# Patient Record
Sex: FEMALE | Race: WHITE | NOT HISPANIC OR LATINO | Employment: OTHER | URBAN - METROPOLITAN AREA
[De-identification: names, ages, dates, MRNs, and addresses within clinical notes are randomized per-mention and may not be internally consistent; named-entity substitution may affect disease eponyms.]

---

## 2018-02-13 ENCOUNTER — HOSPITAL ENCOUNTER (EMERGENCY)
Facility: HOSPITAL | Age: 33
Discharge: HOME | End: 2018-02-13
Attending: EMERGENCY MEDICINE | Admitting: EMERGENCY MEDICINE

## 2018-02-13 VITALS
OXYGEN SATURATION: 100 % | HEART RATE: 87 BPM | SYSTOLIC BLOOD PRESSURE: 120 MMHG | BODY MASS INDEX: 40.09 KG/M2 | WEIGHT: 280 LBS | RESPIRATION RATE: 16 BRPM | DIASTOLIC BLOOD PRESSURE: 87 MMHG | HEIGHT: 70 IN | TEMPERATURE: 97.9 F

## 2018-02-13 DIAGNOSIS — R03.0 ELEVATED BLOOD PRESSURE READING: Primary | ICD-10-CM

## 2018-02-13 LAB
ABO GROUP BLD: NORMAL
HCG INTACT+B SERPL-ACNC: <0.5 MIU/ML
RH BLD: POSITIVE

## 2018-02-13 PROCEDURE — 99283 EMERGENCY DEPT VISIT LOW MDM: CPT

## 2018-02-13 PROCEDURE — 99282 EMERGENCY DEPT VISIT SF MDM: CPT | Performed by: EMERGENCY MEDICINE

## 2018-02-13 PROCEDURE — 86900 BLOOD TYPING SEROLOGIC ABO: CPT | Performed by: EMERGENCY MEDICINE

## 2018-02-13 PROCEDURE — 84702 CHORIONIC GONADOTROPIN TEST: CPT | Performed by: EMERGENCY MEDICINE

## 2018-02-13 PROCEDURE — 86901 BLOOD TYPING SEROLOGIC RH(D): CPT | Performed by: EMERGENCY MEDICINE

## 2018-02-13 RX ORDER — IPRATROPIUM BROMIDE 21 UG/1
2 SPRAY, METERED NASAL EVERY 4 HOURS PRN
COMMUNITY
End: 2020-11-23

## 2018-02-13 NOTE — ED PROVIDER NOTES
Subjective   Patient is a 32 y.o. female presenting with hypertension.   History provided by:  Patient  Hypertension   Severity:  Moderate  Onset quality:  Gradual  Duration:  1 week  Timing:  Constant  Progression:  Worsening  Chronicity:  Recurrent (with pregnancy)  Context comment:  As described below.  Ineffective treatments:  None tried  Associated symptoms: nausea and vomiting    Associated symptoms: no abdominal pain, no blurred vision, no chest pain, no confusion, no dizziness, no ear pain, no epistaxis, no fatigue, no fever, no headaches, no hematuria, no hypokalemia, no loss of consciousness, no neck pain, no palpitations, no peripheral edema, no shortness of breath, no syncope, no tinnitus and no weakness    Risk factors: obesity and tobacco use    Risk factors: no alcohol use, no cardiac disease, no prior aneurysm, no prior stroke and no PVD    Risk factors comment:  Gestational hypertension    HPI Narrative:Ms. Sarita Duncan is a 33 yo WF who presents secondary to elevated BP and possible pregnancy.  Last normal menstrual period was in December.  Patient had several home urine pregnancy test were positive.  Last week she began to experience vaginal bleeding.  She was seen at  A another emergency room.  She was told that she was likely pregnant and miscarrying.  Her urine pregnancy test was borderline.  There was no evidence of intrauterine pregnancy on ultrasound.  Patient reports her blood pressure has been elevated for over a week.  The only time she has hypertension is pregnancy associated.  Patient reports her vaginal bleeding is much improved.  Patient presents for evaluation.        Review of Systems   Constitutional: Negative.  Negative for appetite change, diaphoresis, fatigue and fever.   HENT: Negative.  Negative for ear pain, nosebleeds and tinnitus.    Eyes: Negative.  Negative for blurred vision.   Respiratory: Negative for cough, chest tightness, shortness of breath and wheezing.     Cardiovascular: Negative for chest pain, palpitations, leg swelling and syncope.   Gastrointestinal: Positive for nausea and vomiting. Negative for abdominal pain.   Genitourinary: Negative.  Negative for difficulty urinating, flank pain, frequency and hematuria.   Musculoskeletal: Negative.  Negative for neck pain.   Skin: Negative.  Negative for color change, pallor and rash.   Neurological: Negative.  Negative for dizziness, seizures, loss of consciousness, syncope, weakness and headaches.   Psychiatric/Behavioral: Negative.  Negative for agitation, behavioral problems, confusion and hallucinations.       Past Medical History:   Diagnosis Date   • Asthma    • COPD (chronic obstructive pulmonary disease)    • Histoplasmosis    • Hypothyroid    • Reactive airway disease        Allergies   Allergen Reactions   • Eggs Or Egg-Derived Products GI Intolerance   • Ibuprofen GI Intolerance       Past Surgical History:   Procedure Laterality Date   •  SECTION         History reviewed. No pertinent family history.    Social History     Social History   • Marital status: Significant Other     Spouse name: N/A   • Number of children: N/A   • Years of education: N/A     Social History Main Topics   • Smoking status: Current Every Day Smoker     Packs/day: 1.00     Types: Cigarettes   • Smokeless tobacco: Never Used   • Alcohol use No   • Drug use: No   • Sexual activity: Yes     Partners: Female     Other Topics Concern   • None     Social History Narrative   • None           Objective   Physical Exam   Constitutional: She is oriented to person, place, and time. She appears well-developed and well-nourished. No distress.   32-year-old white female sitting in bed.  Patient appears in good overall health.  She is accompanied by her spouse.   HENT:   Head: Normocephalic and atraumatic.   Right Ear: External ear normal.   Left Ear: External ear normal.   Nose: Nose normal.   Mouth/Throat: Oropharynx is clear and moist.    Eyes: Conjunctivae and EOM are normal. Pupils are equal, round, and reactive to light.   Neck: Normal range of motion. Neck supple.   Cardiovascular: Normal rate, regular rhythm, normal heart sounds and intact distal pulses.  Exam reveals no gallop and no friction rub.    No murmur heard.  Pulmonary/Chest: Effort normal and breath sounds normal.   Abdominal: Soft. Bowel sounds are normal. She exhibits no distension. There is no tenderness.   Musculoskeletal: Normal range of motion.   Neurological: She is alert and oriented to person, place, and time.   Skin: Skin is warm and dry. She is not diaphoretic.   Psychiatric: She has a normal mood and affect. Her behavior is normal.   Nursing note and vitals reviewed.      Procedures         ED Course  ED Course   Comment By Time   02/13/18  5:20 PM  Patient is unsure if she is actually pregnant.  Results were inconclusive at her ER visit and for effort per her report.  They're also concerned that patient could be miscarrying.  I'll obtain blood quantitative hCG. Srinath May MD 02/13 1972   02/13/18  6:41 PM  Patient's blood pressure now 119 or 85.  Quantitative beta hCG is undetectable.  I resected this is not consistent with pregnancy.  It is certainly possible that patient miscarried.  I suspect her blood pressure will normalize over the next week.  Recommend patient stop smoking as well as limit her caffeine to 2 caffeinated drinks per day.  Patient drinks many more caffeinated drinks been this low she is unwilling to give me an exact number.  Discussed at length with patient results, diagnoses treatment and follow-up.  Will DC home. Srinath May MD 02/13 5099      Labs Reviewed   HCG, QUANTITATIVE, PREGNANCY    Narrative:     HCG Expected Values:     Nonpregnant females:               less than 5 mIU/mL     Males:                             less than 5 mIU/mL    Pregnant females:   0-1 Weeks Gestation                5-50   1-2 Weeks Gestation                    2-3 Weeks Gestation                100-5000   3-4 Weeks Gestation                [id number]  4-5 Weeks Gestation                [id number]   5-6 Weeks Gestation                [id number]   6-8 Weeks Gestation                [id number]   2-3 Months Gestation               [id number]      Note:  If a value is between 5-25 mIU/mL recommend            repeat testing and clinical correlation.   ABO/RH             MDM  Number of Diagnoses or Management Options  Elevated blood pressure reading: new and requires workup     Amount and/or Complexity of Data Reviewed  Clinical lab tests: reviewed and ordered    Risk of Complications, Morbidity, and/or Mortality  Presenting problems: moderate  Diagnostic procedures: moderate  Management options: low    Patient Progress  Patient progress: improved      Final diagnoses:   Elevated blood pressure reading              Srinath May MD  02/15/18 0881

## 2018-02-13 NOTE — DISCHARGE INSTRUCTIONS
"Take blood pressure twice daily and record.  Take his record to follow-up with your PMD in 1 week.  Call tomorrow to arrange follow-up.  Follow-up sooner if needed.  Recommend healthy diet and regular exercise.  Recommend stop smoking.  Limit caffeine to 2 caffeinated drinks per day.  Return to ED for worsening symptoms, medical emergencies.    DASH Eating Plan  DASH stands for \"Dietary Approaches to Stop Hypertension.\" The DASH eating plan is a healthy eating plan that has been shown to reduce high blood pressure (hypertension). It may also reduce your risk for type 2 diabetes, heart disease, and stroke. The DASH eating plan may also help with weight loss.  What are tips for following this plan?  General guidelines   · Avoid eating more than 2,300 mg (milligrams) of salt (sodium) a day. If you have hypertension, you may need to reduce your sodium intake to 1,500 mg a day.  · Limit alcohol intake to no more than 1 drink a day for nonpregnant women and 2 drinks a day for men. One drink equals 12 oz of beer, 5 oz of wine, or 1½ oz of hard liquor.  · Work with your health care provider to maintain a healthy body weight or to lose weight. Ask what an ideal weight is for you.  · Get at least 30 minutes of exercise that causes your heart to beat faster (aerobic exercise) most days of the week. Activities may include walking, swimming, or biking.  · Work with your health care provider or diet and nutrition specialist (dietitian) to adjust your eating plan to your individual calorie needs.  Reading food labels   · Check food labels for the amount of sodium per serving. Choose foods with less than 5 percent of the Daily Value of sodium. Generally, foods with less than 300 mg of sodium per serving fit into this eating plan.  · To find whole grains, look for the word \"whole\" as the first word in the ingredient list.  Shopping   · Buy products labeled as \"low-sodium\" or \"no salt added.\"  · Buy fresh foods. Avoid canned foods " and premade or frozen meals.  Cooking   · Avoid adding salt when cooking. Use salt-free seasonings or herbs instead of table salt or sea salt. Check with your health care provider or pharmacist before using salt substitutes.  · Do not araujo foods. Cook foods using healthy methods such as baking, boiling, grilling, and broiling instead.  · Cook with heart-healthy oils, such as olive, canola, soybean, or sunflower oil.  Meal planning     · Eat a balanced diet that includes:  ¨ 5 or more servings of fruits and vegetables each day. At each meal, try to fill half of your plate with fruits and vegetables.  ¨ Up to 6-8 servings of whole grains each day.  ¨ Less than 6 oz of lean meat, poultry, or fish each day. A 3-oz serving of meat is about the same size as a deck of cards. One egg equals 1 oz.  ¨ 2 servings of low-fat dairy each day.  ¨ A serving of nuts, seeds, or beans 5 times each week.  ¨ Heart-healthy fats. Healthy fats called Omega-3 fatty acids are found in foods such as flaxseeds and coldwater fish, like sardines, salmon, and mackerel.  · Limit how much you eat of the following:  ¨ Canned or prepackaged foods.  ¨ Food that is high in trans fat, such as fried foods.  ¨ Food that is high in saturated fat, such as fatty meat.  ¨ Sweets, desserts, sugary drinks, and other foods with added sugar.  ¨ Full-fat dairy products.  · Do not salt foods before eating.  · Try to eat at least 2 vegetarian meals each week.  · Eat more home-cooked food and less restaurant, buffet, and fast food.  · When eating at a restaurant, ask that your food be prepared with less salt or no salt, if possible.  What foods are recommended?  The items listed may not be a complete list. Talk with your dietitian about what dietary choices are best for you.  Grains   Whole-grain or whole-wheat bread. Whole-grain or whole-wheat pasta. Brown rice. Oatmeal. Quinoa. Bulgur. Whole-grain and low-sodium cereals. Maricruz bread. Low-fat, low-sodium crackers.  Whole-wheat flour tortillas.  Vegetables   Fresh or frozen vegetables (raw, steamed, roasted, or grilled). Low-sodium or reduced-sodium tomato and vegetable juice. Low-sodium or reduced-sodium tomato sauce and tomato paste. Low-sodium or reduced-sodium canned vegetables.  Fruits   All fresh, dried, or frozen fruit. Canned fruit in natural juice (without added sugar).  Meat and other protein foods   Skinless chicken or turkey. Ground chicken or turkey. Pork with fat trimmed off. Fish and seafood. Egg whites. Dried beans, peas, or lentils. Unsalted nuts, nut butters, and seeds. Unsalted canned beans. Lean cuts of beef with fat trimmed off. Low-sodium, lean deli meat.  Dairy   Low-fat (1%) or fat-free (skim) milk. Fat-free, low-fat, or reduced-fat cheeses. Nonfat, low-sodium ricotta or cottage cheese. Low-fat or nonfat yogurt. Low-fat, low-sodium cheese.  Fats and oils   Soft margarine without trans fats. Vegetable oil. Low-fat, reduced-fat, or light mayonnaise and salad dressings (reduced-sodium). Canola, safflower, olive, soybean, and sunflower oils. Avocado.  Seasoning and other foods   Herbs. Spices. Seasoning mixes without salt. Unsalted popcorn and pretzels. Fat-free sweets.  What foods are not recommended?  The items listed may not be a complete list. Talk with your dietitian about what dietary choices are best for you.  Grains   Baked goods made with fat, such as croissants, muffins, or some breads. Dry pasta or rice meal packs.  Vegetables   Creamed or fried vegetables. Vegetables in a cheese sauce. Regular canned vegetables (not low-sodium or reduced-sodium). Regular canned tomato sauce and paste (not low-sodium or reduced-sodium). Regular tomato and vegetable juice (not low-sodium or reduced-sodium). Pickles. Olives.  Fruits   Canned fruit in a light or heavy syrup. Fried fruit. Fruit in cream or butter sauce.  Meat and other protein foods   Fatty cuts of meat. Ribs. Fried meat. Nichols. Sausage. Bologna and  other processed lunch meats. Salami. Fatback. Hotdogs. Bratwurst. Salted nuts and seeds. Canned beans with added salt. Canned or smoked fish. Whole eggs or egg yolks. Chicken or turkey with skin.  Dairy   Whole or 2% milk, cream, and half-and-half. Whole or full-fat cream cheese. Whole-fat or sweetened yogurt. Full-fat cheese. Nondairy creamers. Whipped toppings. Processed cheese and cheese spreads.  Fats and oils   Butter. Stick margarine. Lard. Shortening. Ghee. Nichols fat. Tropical oils, such as coconut, palm kernel, or palm oil.  Seasoning and other foods   Salted popcorn and pretzels. Onion salt, garlic salt, seasoned salt, table salt, and sea salt. Worcestershire sauce. Tartar sauce. Barbecue sauce. Teriyaki sauce. Soy sauce, including reduced-sodium. Steak sauce. Canned and packaged gravies. Fish sauce. Oyster sauce. Cocktail sauce. Horseradish that you find on the shelf. Ketchup. Mustard. Meat flavorings and tenderizers. Bouillon cubes. Hot sauce and Tabasco sauce. Premade or packaged marinades. Premade or packaged taco seasonings. Relishes. Regular salad dressings.  Where to find more information:  · National Heart, Lung, and Blood [place]: www.nhlbi.nih.gov  · American Heart Association: www.heart.org  Summary  · The DASH eating plan is a healthy eating plan that has been shown to reduce high blood pressure (hypertension). It may also reduce your risk for type 2 diabetes, heart disease, and stroke.  · With the DASH eating plan, you should limit salt (sodium) intake to 2,300 mg a day. If you have hypertension, you may need to reduce your sodium intake to 1,500 mg a day.  · When on the DASH eating plan, aim to eat more fresh fruits and vegetables, whole grains, lean proteins, low-fat dairy, and heart-healthy fats.  · Work with your health care provider or diet and nutrition specialist (dietitian) to adjust your eating plan to your individual calorie needs.  This information is not intended to replace advice  "given to you by your health care provider. Make sure you discuss any questions you have with your health care provider.  Document Released: 12/06/2012 Document Revised: 12/11/2017 Document Reviewed: 12/11/2017  [email or web address] Interactive Patient Education © 2017 [email or web address]      DASH Eating Plan  DASH stands for \"Dietary Approaches to Stop Hypertension.\" The DASH eating plan is a healthy eating plan that has been shown to reduce high blood pressure (hypertension). It may also reduce your risk for type 2 diabetes, heart disease, and stroke. The DASH eating plan may also help with weight loss.  What are tips for following this plan?  General guidelines   · Avoid eating more than 2,300 mg (milligrams) of salt (sodium) a day. If you have hypertension, you may need to reduce your sodium intake to 1,500 mg a day.  · Limit alcohol intake to no more than 1 drink a day for nonpregnant women and 2 drinks a day for men. One drink equals 12 oz of beer, 5 oz of wine, or 1½ oz of hard liquor.  · Work with your health care provider to maintain a healthy body weight or to lose weight. Ask what an ideal weight is for you.  · Get at least 30 minutes of exercise that causes your heart to beat faster (aerobic exercise) most days of the week. Activities may include walking, swimming, or biking.  · Work with your health care provider or diet and nutrition specialist (dietitian) to adjust your eating plan to your individual calorie needs.  Reading food labels   · Check food labels for the amount of sodium per serving. Choose foods with less than 5 percent of the Daily Value of sodium. Generally, foods with less than 300 mg of sodium per serving fit into this eating plan.  · To find whole grains, look for the word \"whole\" as the first word in the ingredient list.  Shopping   · Buy products labeled as \"low-sodium\" or \"no salt added.\"  · Buy fresh foods. Avoid canned foods and premade or frozen meals.  Cooking   · Avoid adding salt when cooking. " Use salt-free seasonings or herbs instead of table salt or sea salt. Check with your health care provider or pharmacist before using salt substitutes.  · Do not araujo foods. Cook foods using healthy methods such as baking, boiling, grilling, and broiling instead.  · Cook with heart-healthy oils, such as olive, canola, soybean, or sunflower oil.  Meal planning     · Eat a balanced diet that includes:  ¨ 5 or more servings of fruits and vegetables each day. At each meal, try to fill half of your plate with fruits and vegetables.  ¨ Up to 6-8 servings of whole grains each day.  ¨ Less than 6 oz of lean meat, poultry, or fish each day. A 3-oz serving of meat is about the same size as a deck of cards. One egg equals 1 oz.  ¨ 2 servings of low-fat dairy each day.  ¨ A serving of nuts, seeds, or beans 5 times each week.  ¨ Heart-healthy fats. Healthy fats called Omega-3 fatty acids are found in foods such as flaxseeds and coldwater fish, like sardines, salmon, and mackerel.  · Limit how much you eat of the following:  ¨ Canned or prepackaged foods.  ¨ Food that is high in trans fat, such as fried foods.  ¨ Food that is high in saturated fat, such as fatty meat.  ¨ Sweets, desserts, sugary drinks, and other foods with added sugar.  ¨ Full-fat dairy products.  · Do not salt foods before eating.  · Try to eat at least 2 vegetarian meals each week.  · Eat more home-cooked food and less restaurant, buffet, and fast food.  · When eating at a restaurant, ask that your food be prepared with less salt or no salt, if possible.  What foods are recommended?  The items listed may not be a complete list. Talk with your dietitian about what dietary choices are best for you.  Grains   Whole-grain or whole-wheat bread. Whole-grain or whole-wheat pasta. Brown rice. Oatmeal. Quinoa. Bulgur. Whole-grain and low-sodium cereals. Maricruz bread. Low-fat, low-sodium crackers. Whole-wheat flour tortillas.  Vegetables   Fresh or frozen vegetables (raw,  steamed, roasted, or grilled). Low-sodium or reduced-sodium tomato and vegetable juice. Low-sodium or reduced-sodium tomato sauce and tomato paste. Low-sodium or reduced-sodium canned vegetables.  Fruits   All fresh, dried, or frozen fruit. Canned fruit in natural juice (without added sugar).  Meat and other protein foods   Skinless chicken or turkey. Ground chicken or turkey. Pork with fat trimmed off. Fish and seafood. Egg whites. Dried beans, peas, or lentils. Unsalted nuts, nut butters, and seeds. Unsalted canned beans. Lean cuts of beef with fat trimmed off. Low-sodium, lean deli meat.  Dairy   Low-fat (1%) or fat-free (skim) milk. Fat-free, low-fat, or reduced-fat cheeses. Nonfat, low-sodium ricotta or cottage cheese. Low-fat or nonfat yogurt. Low-fat, low-sodium cheese.  Fats and oils   Soft margarine without trans fats. Vegetable oil. Low-fat, reduced-fat, or light mayonnaise and salad dressings (reduced-sodium). Canola, safflower, olive, soybean, and sunflower oils. Avocado.  Seasoning and other foods   Herbs. Spices. Seasoning mixes without salt. Unsalted popcorn and pretzels. Fat-free sweets.  What foods are not recommended?  The items listed may not be a complete list. Talk with your dietitian about what dietary choices are best for you.  Grains   Baked goods made with fat, such as croissants, muffins, or some breads. Dry pasta or rice meal packs.  Vegetables   Creamed or fried vegetables. Vegetables in a cheese sauce. Regular canned vegetables (not low-sodium or reduced-sodium). Regular canned tomato sauce and paste (not low-sodium or reduced-sodium). Regular tomato and vegetable juice (not low-sodium or reduced-sodium). Pickles. Olives.  Fruits   Canned fruit in a light or heavy syrup. Fried fruit. Fruit in cream or butter sauce.  Meat and other protein foods   Fatty cuts of meat. Ribs. Fried meat. Nichols. Sausage. Bologna and other processed lunch meats. Salami. Fatback. Hotdogs. Bratwurst. Salted nuts  and seeds. Canned beans with added salt. Canned or smoked fish. Whole eggs or egg yolks. Chicken or turkey with skin.  Dairy   Whole or 2% milk, cream, and half-and-half. Whole or full-fat cream cheese. Whole-fat or sweetened yogurt. Full-fat cheese. Nondairy creamers. Whipped toppings. Processed cheese and cheese spreads.  Fats and oils   Butter. Stick margarine. Lard. Shortening. Ghee. Nichols fat. Tropical oils, such as coconut, palm kernel, or palm oil.  Seasoning and other foods   Salted popcorn and pretzels. Onion salt, garlic salt, seasoned salt, table salt, and sea salt. Worcestershire sauce. Tartar sauce. Barbecue sauce. Teriyaki sauce. Soy sauce, including reduced-sodium. Steak sauce. Canned and packaged gravies. Fish sauce. Oyster sauce. Cocktail sauce. Horseradish that you find on the shelf. Ketchup. Mustard. Meat flavorings and tenderizers. Bouillon cubes. Hot sauce and Tabasco sauce. Premade or packaged marinades. Premade or packaged taco seasonings. Relishes. Regular salad dressings.  Where to find more information:  · National Heart, Lung, and Blood [place]: www.nhlbi.nih.gov  · American Heart Association: www.heart.org  Summary  · The DASH eating plan is a healthy eating plan that has been shown to reduce high blood pressure (hypertension). It may also reduce your risk for type 2 diabetes, heart disease, and stroke.  · With the DASH eating plan, you should limit salt (sodium) intake to 2,300 mg a day. If you have hypertension, you may need to reduce your sodium intake to 1,500 mg a day.  · When on the DASH eating plan, aim to eat more fresh fruits and vegetables, whole grains, lean proteins, low-fat dairy, and heart-healthy fats.  · Work with your health care provider or diet and nutrition specialist (dietitian) to adjust your eating plan to your individual calorie needs.  This information is not intended to replace advice given to you by your health care provider. Make sure you discuss any  questions you have with your health care provider.  Document Released: 12/06/2012 Document Revised: 12/11/2017 Document Reviewed: 12/11/2017  [employer] Interactive Patient Education © 2017 [employer]      Hypertension  Hypertension is another name for high blood pressure. High blood pressure forces your heart to work harder to pump blood. This can cause problems over time.  There are two numbers in a blood pressure reading. There is a top number (systolic) over a bottom number (diastolic). It is best to have a blood pressure below 120/80. Healthy choices can help lower your blood pressure. You may need medicine to help lower your blood pressure if:  · Your blood pressure cannot be lowered with healthy choices.  · Your blood pressure is higher than 130/80.  Follow these instructions at home:  Eating and drinking   · If directed, follow the DASH eating plan. This diet includes:  ¨ Filling half of your plate at each meal with fruits and vegetables.  ¨ Filling one quarter of your plate at each meal with whole grains. Whole grains include whole wheat pasta, brown rice, and whole grain bread.  ¨ Eating or drinking low-fat dairy products, such as skim milk or low-fat yogurt.  ¨ Filling one quarter of your plate at each meal with low-fat (lean) proteins. Low-fat proteins include fish, skinless chicken, eggs, beans, and tofu.  ¨ Avoiding fatty meat, cured and processed meat, or chicken with skin.  ¨ Avoiding premade or processed food.  · Eat less than 1,500 mg of salt (sodium) a day.  · Limit alcohol use to no more than 1 drink a day for nonpregnant women and 2 drinks a day for men. One drink equals 12 oz of beer, 5 oz of wine, or 1½ oz of hard liquor.  Lifestyle   · Work with your doctor to stay at a healthy weight or to lose weight. Ask your doctor what the best weight is for you.  · Get at least 30 minutes of exercise that causes your heart to beat faster (aerobic exercise) most days of the week. This may include  walking, swimming, or biking.  · Get at least 30 minutes of exercise that strengthens your muscles (resistance exercise) at least 3 days a week. This may include lifting weights or pilates.  · Do not use any products that contain nicotine or tobacco. This includes cigarettes and e-cigarettes. If you need help quitting, ask your doctor.  · Check your blood pressure at home as told by your doctor.  · Keep all follow-up visits as told by your doctor. This is important.  Medicines   · Take over-the-counter and prescription medicines only as told by your doctor. Follow directions carefully.  · Do not skip doses of blood pressure medicine. The medicine does not work as well if you skip doses. Skipping doses also puts you at risk for problems.  · Ask your doctor about side effects or reactions to medicines that you should watch for.  Contact a doctor if:  · You think you are having a reaction to the medicine you are taking.  · You have headaches that keep coming back (recurring).  · You feel dizzy.  · You have swelling in your ankles.  · You have trouble with your vision.  Get help right away if:  · You get a very bad headache.  · You start to feel confused.  · You feel weak or numb.  · You feel faint.  · You get very bad pain in your:  ¨ Chest.  ¨ Belly (abdomen).  · You throw up (vomit) more than once.  · You have trouble breathing.  Summary  · Hypertension is another name for high blood pressure.  · Making healthy choices can help lower blood pressure. If your blood pressure cannot be controlled with healthy choices, you may need to take medicine.  This information is not intended to replace advice given to you by your health care provider. Make sure you discuss any questions you have with your health care provider.  Document Released: 06/05/2009 Document Revised: 11/15/2017 Document Reviewed: 11/15/2017  [employer] Interactive Patient Education © 2017 [employer]      Hypertension  Hypertension, commonly called high  "blood pressure, is when the force of blood pumping through the arteries is too strong. The arteries are the blood vessels that carry blood from the heart throughout the body. Hypertension forces the heart to work harder to pump blood and may cause arteries to become narrow or stiff. Having untreated or uncontrolled hypertension can cause heart attacks, strokes, kidney disease, and other problems.  A blood pressure reading consists of a higher number over a lower number. Ideally, your blood pressure should be below 120/80. The first (\"top\") number is called the systolic pressure. It is a measure of the pressure in your arteries as your heart beats. The second (\"bottom\") number is called the diastolic pressure. It is a measure of the pressure in your arteries as the heart relaxes.  What are the causes?  The cause of this condition is not known.  What increases the risk?  Some risk factors for high blood pressure are under your control. Others are not.  Factors you can change   · Smoking.  · Having type 2 diabetes mellitus, high cholesterol, or both.  · Not getting enough exercise or physical activity.  · Being overweight.  · Having too much fat, sugar, calories, or salt (sodium) in your diet.  · Drinking too much alcohol.  Factors that are difficult or impossible to change   · Having chronic kidney disease.  · Having a family history of high blood pressure.  · Age. Risk increases with age.  · Race. You may be at higher risk if you are -American.  · Gender. Men are at higher risk than women before age 45. After age 65, women are at higher risk than men.  · Having obstructive sleep apnea.  · Stress.  What are the signs or symptoms?  Extremely high blood pressure (hypertensive crisis) may cause:  · Headache.  · Anxiety.  · Shortness of breath.  · Nosebleed.  · Nausea and vomiting.  · Severe chest pain.  · Jerky movements you cannot control (seizures).  How is this diagnosed?  This condition is diagnosed by " measuring your blood pressure while you are seated, with your arm resting on a surface. The cuff of the blood pressure monitor will be placed directly against the skin of your upper arm at the level of your heart. It should be measured at least twice using the same arm. Certain conditions can cause a difference in blood pressure between your right and left arms.  Certain factors can cause blood pressure readings to be lower or higher than normal (elevated) for a short period of time:  · When your blood pressure is higher when you are in a health care provider's office than when you are at home, this is called white coat hypertension. Most people with this condition do not need medicines.  · When your blood pressure is higher at home than when you are in a health care provider's office, this is called masked hypertension. Most people with this condition may need medicines to control blood pressure.  If you have a high blood pressure reading during one visit or you have normal blood pressure with other risk factors:  · You may be asked to return on a different day to have your blood pressure checked again.  · You may be asked to monitor your blood pressure at home for 1 week or longer.  If you are diagnosed with hypertension, you may have other blood or imaging tests to help your health care provider understand your overall risk for other conditions.  How is this treated?  This condition is treated by making healthy lifestyle changes, such as eating healthy foods, exercising more, and reducing your alcohol intake. Your health care provider may prescribe medicine if lifestyle changes are not enough to get your blood pressure under control, and if:  · Your systolic blood pressure is above 130.  · Your diastolic blood pressure is above 80.  Your personal target blood pressure may vary depending on your medical conditions, your age, and other factors.  Follow these instructions at home:  Eating and drinking   · Eat a diet  that is high in fiber and potassium, and low in sodium, added sugar, and fat. An example eating plan is called the DASH (Dietary Approaches to Stop Hypertension) diet. To eat this way:  ¨ Eat plenty of fresh fruits and vegetables. Try to fill half of your plate at each meal with fruits and vegetables.  ¨ Eat whole grains, such as whole wheat pasta, brown rice, or whole grain bread. Fill about one quarter of your plate with whole grains.  ¨ Eat or drink low-fat dairy products, such as skim milk or low-fat yogurt.  ¨ Avoid fatty cuts of meat, processed or cured meats, and poultry with skin. Fill about one quarter of your plate with lean proteins, such as fish, chicken without skin, beans, eggs, and tofu.  ¨ Avoid premade and processed foods. These tend to be higher in sodium, added sugar, and fat.  · Reduce your daily sodium intake. Most people with hypertension should eat less than 1,500 mg of sodium a day.  · Limit alcohol intake to no more than 1 drink a day for nonpregnant women and 2 drinks a day for men. One drink equals 12 oz of beer, 5 oz of wine, or 1½ oz of hard liquor.  Lifestyle   · Work with your health care provider to maintain a healthy body weight or to lose weight. Ask what an ideal weight is for you.  · Get at least 30 minutes of exercise that causes your heart to beat faster (aerobic exercise) most days of the week. Activities may include walking, swimming, or biking.  · Include exercise to strengthen your muscles (resistance exercise), such as pilates or lifting weights, as part of your weekly exercise routine. Try to do these types of exercises for 30 minutes at least 3 days a week.  · Do not use any products that contain nicotine or tobacco, such as cigarettes and e-cigarettes. If you need help quitting, ask your health care provider.  · Monitor your blood pressure at home as told by your health care provider.  · Keep all follow-up visits as told by your health care provider. This is  important.  Medicines   · Take over-the-counter and prescription medicines only as told by your health care provider. Follow directions carefully. Blood pressure medicines must be taken as prescribed.  · Do not skip doses of blood pressure medicine. Doing this puts you at risk for problems and can make the medicine less effective.  · Ask your health care provider about side effects or reactions to medicines that you should watch for.  Contact a health care provider if:  · You think you are having a reaction to a medicine you are taking.  · You have headaches that keep coming back (recurring).  · You feel dizzy.  · You have swelling in your ankles.  · You have trouble with your vision.  Get help right away if:  · You develop a severe headache or confusion.  · You have unusual weakness or numbness.  · You feel faint.  · You have severe pain in your chest or abdomen.  · You vomit repeatedly.  · You have trouble breathing.  Summary  · Hypertension is when the force of blood pumping through your arteries is too strong. If this condition is not controlled, it may put you at risk for serious complications.  · Your personal target blood pressure may vary depending on your medical conditions, your age, and other factors. For most people, a normal blood pressure is less than 120/80.  · Hypertension is treated with lifestyle changes, medicines, or a combination of both. Lifestyle changes include weight loss, eating a healthy, low-sodium diet, exercising more, and limiting alcohol.  This information is not intended to replace advice given to you by your health care provider. Make sure you discuss any questions you have with your health care provider.  Document Released: 12/18/2006 Document Revised: 11/15/2017 Document Reviewed: 11/15/2017  [employer] Interactive Patient Education © 2017 [employer]

## 2018-03-22 ENCOUNTER — OFFICE VISIT (OUTPATIENT)
Dept: OBSTETRICS AND GYNECOLOGY | Facility: CLINIC | Age: 33
End: 2018-03-22

## 2018-03-22 VITALS
SYSTOLIC BLOOD PRESSURE: 122 MMHG | BODY MASS INDEX: 41.95 KG/M2 | DIASTOLIC BLOOD PRESSURE: 82 MMHG | HEIGHT: 70 IN | WEIGHT: 293 LBS

## 2018-03-22 DIAGNOSIS — Z13.9 SCREENING FOR CONDITION: ICD-10-CM

## 2018-03-22 DIAGNOSIS — N91.2 AMENORRHEA: Primary | ICD-10-CM

## 2018-03-22 DIAGNOSIS — O16.1 MATERNAL HYPERTENSION IN FIRST TRIMESTER: ICD-10-CM

## 2018-03-22 LAB
B-HCG UR QL: POSITIVE
BILIRUB BLD-MCNC: NEGATIVE MG/DL
CLARITY, POC: CLEAR
COLOR UR: YELLOW
GLUCOSE UR STRIP-MCNC: NEGATIVE MG/DL
HCG INTACT+B SERPL-ACNC: NORMAL MIU/ML
INTERNAL NEGATIVE CONTROL: NEGATIVE
INTERNAL POSITIVE CONTROL: POSITIVE
KETONES UR QL: NEGATIVE
LEUKOCYTE EST, POC: NEGATIVE
Lab: ABNORMAL
NITRITE UR-MCNC: NEGATIVE MG/ML
PH UR: 5 [PH] (ref 5–8)
PROT UR STRIP-MCNC: NEGATIVE MG/DL
RBC # UR STRIP: NEGATIVE /UL
SP GR UR: 1 (ref 1–1.03)
UROBILINOGEN UR QL: NORMAL

## 2018-03-22 PROCEDURE — 81002 URINALYSIS NONAUTO W/O SCOPE: CPT | Performed by: OBSTETRICS & GYNECOLOGY

## 2018-03-22 PROCEDURE — 99213 OFFICE O/P EST LOW 20 MIN: CPT | Performed by: OBSTETRICS & GYNECOLOGY

## 2018-03-22 PROCEDURE — 81025 URINE PREGNANCY TEST: CPT | Performed by: OBSTETRICS & GYNECOLOGY

## 2018-03-22 RX ORDER — LEVOTHYROXINE SODIUM 0.1 MG/1
100 TABLET ORAL DAILY
Refills: 2 | COMMUNITY
Start: 2018-02-22 | End: 2020-11-23

## 2018-03-22 RX ORDER — IPRATROPIUM BROMIDE AND ALBUTEROL SULFATE 2.5; .5 MG/3ML; MG/3ML
SOLUTION RESPIRATORY (INHALATION)
Refills: 2 | COMMUNITY
Start: 2018-03-09 | End: 2021-09-14

## 2018-03-22 RX ORDER — ONDANSETRON 4 MG/1
4 TABLET, FILM COATED ORAL EVERY 4 HOURS PRN
Qty: 30 TABLET | Refills: 2 | Status: SHIPPED | OUTPATIENT
Start: 2018-03-22 | End: 2018-04-17 | Stop reason: SDUPTHER

## 2018-03-22 RX ORDER — ALBUTEROL SULFATE 90 UG/1
AEROSOL, METERED RESPIRATORY (INHALATION)
Refills: 1 | COMMUNITY
Start: 2018-03-09 | End: 2020-12-21 | Stop reason: SDUPTHER

## 2018-03-22 RX ORDER — MONTELUKAST SODIUM 10 MG/1
10 TABLET ORAL DAILY
Refills: 5 | COMMUNITY
Start: 2018-02-21 | End: 2020-11-23

## 2018-03-22 NOTE — PROGRESS NOTES
"      Sarita Duncan is a 32 y.o. patient who presents for follow up of   Chief Complaint   Patient presents with   • Gynecologic Exam       HPI this is a 32-year-old  6 para 2123 who presents complaining of amenorrhea.  Patient gives a history of miscarrying last month.  She reports a positive home pregnancy test on  of this month.  She does say she thinks her Quant went down to 0 because she was in the emergency room between the time she miscarried and they said it was negative.  She thinks this is a new pregnancy.  She's had no vaginal bleeding.  She does have nausea associated.  Patient requests Zofran.  Patient has had 3 prior  sections.    The following portions of the patient's history were reviewed and updated as appropriate: allergies, current medications and problem list.    Review of Systems   Constitutional: Negative for appetite change, fever and unexpected weight change.   Respiratory: Negative for cough and shortness of breath.    Cardiovascular: Negative for chest pain and palpitations.   Gastrointestinal: Negative for abdominal distention, abdominal pain, constipation, diarrhea, nausea and vomiting.   Endocrine: Negative.    Genitourinary: Positive for menstrual problem (amenorrhea). Negative for dyspareunia, pelvic pain and vaginal discharge.   Skin: Negative.    Hematological: Negative.    Psychiatric/Behavioral: Negative for dysphoric mood and sleep disturbance. The patient is not nervous/anxious.        /82   Ht 177.8 cm (70\")   Wt 135 kg (298 lb)   LMP 2018   BMI 42.76 kg/m²     Physical Exam   Constitutional: She is oriented to person, place, and time. She appears well-developed and well-nourished.   HENT:   Head: Normocephalic and atraumatic.   Pulmonary/Chest: Effort normal.   Abdominal: Soft. She exhibits no distension and no mass. There is no tenderness. There is no rebound and no guarding.   Musculoskeletal: Normal range of motion.   Neurological: She " is alert and oriented to person, place, and time.   Skin: Skin is warm and dry.   Psychiatric: She has a normal mood and affect. Her behavior is normal. Judgment and thought content normal.   Nursing note and vitals reviewed.        Assessment/Plan    Sarita was seen today for gynecologic exam.    Diagnoses and all orders for this visit:    Amenorrhea  -     HCG, B-subunit, Quantitative    Screening for condition  -     POC Urinalysis Dipstick  -     POC Pregnancy, Urine    Maternal hypertension in first trimester   -     HCG, B-subunit, Quantitative    Other orders  -     ondansetron (ZOFRAN) 4 MG tablet; Take 1 tablet by mouth Every 4 (Four) Hours As Needed for Nausea or Vomiting.    Patient has a history of preeclampsia and all her previous pregnancies.  She also has delivered by  section.  She's had a recent miscarriage and may be at increased risk of SAB given the close proximity to her last pregnancy.  We'll check hCG level today.  Ultrasound quantitative hCG above 1500.    Return for US, TV, repeat quant HCG.      Ashu Smith MD  3/22/2018  2:48 PM

## 2018-03-27 ENCOUNTER — PROCEDURE VISIT (OUTPATIENT)
Dept: OBSTETRICS AND GYNECOLOGY | Facility: CLINIC | Age: 33
End: 2018-03-27

## 2018-03-27 DIAGNOSIS — O36.80X0 ENCOUNTER TO DETERMINE FETAL VIABILITY OF PREGNANCY, SINGLE OR UNSPECIFIED FETUS: Primary | ICD-10-CM

## 2018-03-27 PROCEDURE — 76801 OB US < 14 WKS SINGLE FETUS: CPT | Performed by: OBSTETRICS & GYNECOLOGY

## 2018-04-12 ENCOUNTER — INITIAL PRENATAL (OUTPATIENT)
Dept: OBSTETRICS AND GYNECOLOGY | Facility: CLINIC | Age: 33
End: 2018-04-12

## 2018-04-12 ENCOUNTER — PROCEDURE VISIT (OUTPATIENT)
Dept: OBSTETRICS AND GYNECOLOGY | Facility: CLINIC | Age: 33
End: 2018-04-12

## 2018-04-12 VITALS — BODY MASS INDEX: 42.76 KG/M2 | DIASTOLIC BLOOD PRESSURE: 88 MMHG | SYSTOLIC BLOOD PRESSURE: 112 MMHG | WEIGHT: 293 LBS

## 2018-04-12 DIAGNOSIS — R10.30 PREGNANCY WITH ABDOMINAL CRAMPING OF LOWER QUADRANT, ANTEPARTUM: ICD-10-CM

## 2018-04-12 DIAGNOSIS — O26.899 PREGNANCY WITH ABDOMINAL CRAMPING OF LOWER QUADRANT, ANTEPARTUM: ICD-10-CM

## 2018-04-12 DIAGNOSIS — Z87.59 HISTORY OF MISCARRIAGE: ICD-10-CM

## 2018-04-12 DIAGNOSIS — O36.80X0 ENCOUNTER TO DETERMINE FETAL VIABILITY OF PREGNANCY, SINGLE OR UNSPECIFIED FETUS: Primary | ICD-10-CM

## 2018-04-12 DIAGNOSIS — Z51.81 ENCOUNTER FOR THERAPEUTIC DRUG LEVEL MONITORING: ICD-10-CM

## 2018-04-12 DIAGNOSIS — Z01.419 PAP SMEAR, LOW-RISK: ICD-10-CM

## 2018-04-12 DIAGNOSIS — Z3A.01 LESS THAN 8 WEEKS GESTATION OF PREGNANCY: ICD-10-CM

## 2018-04-12 DIAGNOSIS — O09.91 SUPERVISION OF HIGH RISK PREGNANCY IN FIRST TRIMESTER: Primary | ICD-10-CM

## 2018-04-12 LAB
CANNABINOIDS SERPL QL: POSITIVE
EXTERNAL CYSTIC FIBROSIS: NORMAL

## 2018-04-12 PROCEDURE — 99213 OFFICE O/P EST LOW 20 MIN: CPT | Performed by: OBSTETRICS & GYNECOLOGY

## 2018-04-12 PROCEDURE — 76817 TRANSVAGINAL US OBSTETRIC: CPT | Performed by: OBSTETRICS & GYNECOLOGY

## 2018-04-12 NOTE — PROGRESS NOTES
OB follow up     Sarita Duncan is a 32 y.o.  9w4d being seen today for her obstetrical visit.  Patient reports no bleeding, no contractions and no leaking. Fetal movement: absent.    Review of Systems  No bleeding, No cramping/contractions     /88   Wt 135 kg (298 lb)   LMP 2018   BMI 42.76 kg/m²     FHT: present BPM   Uterine Size: 9 cm   Ultrasound was reviewed with the patient and father of the baby.  Both ovaries appear normal.  Crown rump length measured 9 weeks and 4 days.  Her EDC is 2018.  Townsend fetus with no adnexal masses was seen.    Assessment/Plan:    1) 32 y.o.  -pregnancy at 9w4d    2)   Encounter Diagnoses   Name Primary?   • Supervision of high risk pregnancy in first trimester  Yes   • Less than 8 weeks gestation of pregnancy    • Pap smear, low-risk        3) Reviewed this stage of pregnancy  4) Problem list updated     Return in about 4 weeks (around 5/10/2018) for OB Tummy.      Ashu Smith MD    2018  2:43 PM

## 2018-04-13 LAB
ABO GROUP BLD: (no result)
BASOPHILS # BLD AUTO: 0 X10E3/UL (ref 0–0.2)
BASOPHILS NFR BLD AUTO: 0 %
BLD GP AB SCN SERPL QL: NEGATIVE
EOSINOPHIL # BLD AUTO: 0.2 X10E3/UL (ref 0–0.4)
EOSINOPHIL NFR BLD AUTO: 2 %
ERYTHROCYTE [DISTWIDTH] IN BLOOD BY AUTOMATED COUNT: 13.2 % (ref 12.3–15.4)
HBA1C MFR BLD: 5.1 % (ref 4.8–5.6)
HBV SURFACE AG SERPL QL IA: NEGATIVE
HCT VFR BLD AUTO: 43 % (ref 34–46.6)
HCV AB S/CO SERPL IA: <0.1 S/CO RATIO (ref 0–0.9)
HGB BLD-MCNC: 14.2 G/DL (ref 11.1–15.9)
HIV 1+2 AB+HIV1 P24 AG SERPL QL IA: NON REACTIVE
IMM GRANULOCYTES # BLD: 0 X10E3/UL (ref 0–0.1)
IMM GRANULOCYTES NFR BLD: 0 %
LYMPHOCYTES # BLD AUTO: 2.5 X10E3/UL (ref 0.7–3.1)
LYMPHOCYTES NFR BLD AUTO: 22 %
MCH RBC QN AUTO: 30.9 PG (ref 26.6–33)
MCHC RBC AUTO-ENTMCNC: 33 G/DL (ref 31.5–35.7)
MCV RBC AUTO: 94 FL (ref 79–97)
MONOCYTES # BLD AUTO: 0.8 X10E3/UL (ref 0.1–0.9)
MONOCYTES NFR BLD AUTO: 7 %
NEUTROPHILS # BLD AUTO: 8.2 X10E3/UL (ref 1.4–7)
NEUTROPHILS NFR BLD AUTO: 69 %
PLATELET # BLD AUTO: 287 X10E3/UL (ref 150–379)
RBC # BLD AUTO: 4.59 X10E6/UL (ref 3.77–5.28)
RH BLD: POSITIVE
RPR SER QL: NON REACTIVE
RUBV IGG SERPL IA-ACNC: 3.84 INDEX
TSH SERPL DL<=0.005 MIU/L-ACNC: 3.14 MIU/ML (ref 0.27–4.2)
WBC # BLD AUTO: 11.7 X10E3/UL (ref 3.4–10.8)

## 2018-04-17 LAB
CYTOLOGIST CVX/VAG CYTO: NORMAL
CYTOLOGY CVX/VAG DOC THIN PREP: NORMAL
DX ICD CODE: NORMAL
DX ICD CODE: NORMAL
HIV 1 & 2 AB SER-IMP: NORMAL
HPV I/H RISK 1 DNA CVX QL PROBE+SIG AMP: NEGATIVE
OTHER STN SPEC: NORMAL
PATH REPORT.FINAL DX SPEC: NORMAL
STAT OF ADQ CVX/VAG CYTO-IMP: NORMAL

## 2018-04-18 PROBLEM — F12.10 MILD TETRAHYDROCANNABINOL (THC) ABUSE: Status: ACTIVE | Noted: 2018-04-18

## 2018-04-18 LAB
BACTERIA UR CULT: NO GROWTH
BACTERIA UR CULT: NORMAL
DRUGS UR: NORMAL

## 2018-04-18 RX ORDER — ONDANSETRON 4 MG/1
4 TABLET, FILM COATED ORAL EVERY 4 HOURS PRN
Qty: 30 TABLET | Refills: 2 | Status: SHIPPED | OUTPATIENT
Start: 2018-04-18 | End: 2018-05-10 | Stop reason: SDUPTHER

## 2018-05-10 ENCOUNTER — ROUTINE PRENATAL (OUTPATIENT)
Dept: OBSTETRICS AND GYNECOLOGY | Facility: CLINIC | Age: 33
End: 2018-05-10

## 2018-05-10 VITALS — DIASTOLIC BLOOD PRESSURE: 82 MMHG | WEIGHT: 289 LBS | SYSTOLIC BLOOD PRESSURE: 118 MMHG | BODY MASS INDEX: 41.47 KG/M2

## 2018-05-10 DIAGNOSIS — Z34.92 NORMAL PREGNANCY IN SECOND TRIMESTER: Primary | ICD-10-CM

## 2018-05-10 DIAGNOSIS — O21.9 NAUSEA AND VOMITING DURING PREGNANCY PRIOR TO 22 WEEKS GESTATION: ICD-10-CM

## 2018-05-10 PROCEDURE — 99213 OFFICE O/P EST LOW 20 MIN: CPT | Performed by: OBSTETRICS & GYNECOLOGY

## 2018-05-10 RX ORDER — ONDANSETRON 4 MG/1
4 TABLET, FILM COATED ORAL EVERY 4 HOURS PRN
Qty: 30 TABLET | Refills: 2 | Status: SHIPPED | OUTPATIENT
Start: 2018-05-10 | End: 2018-06-11 | Stop reason: SDUPTHER

## 2018-06-07 ENCOUNTER — ROUTINE PRENATAL (OUTPATIENT)
Dept: OBSTETRICS AND GYNECOLOGY | Facility: CLINIC | Age: 33
End: 2018-06-07

## 2018-06-07 VITALS — SYSTOLIC BLOOD PRESSURE: 122 MMHG | BODY MASS INDEX: 42.13 KG/M2 | DIASTOLIC BLOOD PRESSURE: 82 MMHG | WEIGHT: 293 LBS

## 2018-06-07 DIAGNOSIS — E03.9 HYPOTHYROIDISM, UNSPECIFIED TYPE: ICD-10-CM

## 2018-06-07 DIAGNOSIS — Z34.92 NORMAL PREGNANCY IN SECOND TRIMESTER: Primary | ICD-10-CM

## 2018-06-07 LAB — 2ND TRIMESTER 4 SCREEN SERPL-IMP: NORMAL

## 2018-06-07 PROCEDURE — 99213 OFFICE O/P EST LOW 20 MIN: CPT | Performed by: OBSTETRICS & GYNECOLOGY

## 2018-06-07 NOTE — PROGRESS NOTES
OB follow up     Sarita Duncan is a 32 y.o.  17w4d being seen today for her obstetrical visit.  Patient reports no bleeding, no contractions and no leaking. Fetal movement: normal.  Her nausea vomiting is gotten much better and there is been some weight gain.  She has on thyroid replacement therapy.    Review of Systems  No bleeding, No cramping/contractions     /82   Wt 133 kg (293 lb 9.6 oz)   LMP 2018   BMI 42.13 kg/m²     FHT: present BPM   Uterine Size: 17 cm       Assessment/Plan:    1) 32 y.o.  -pregnancy at 17w4d    2)   Encounter Diagnoses   Name Primary?   • Normal pregnancy in second trimester Yes   • Hypothyroidism, unspecified type    Declines Quad screen.    3) Reviewed this stage of pregnancy  4) Problem list updated     Return in about 3 weeks (around 2018) for US, Anatomy, OB Tummy.      Ashu Smith MD    2018  4:59 PM

## 2018-06-11 RX ORDER — ONDANSETRON 4 MG/1
4 TABLET, FILM COATED ORAL EVERY 4 HOURS PRN
Qty: 30 TABLET | Refills: 2 | Status: SHIPPED | OUTPATIENT
Start: 2018-06-11 | End: 2018-07-12 | Stop reason: SDUPTHER

## 2018-07-02 ENCOUNTER — PROCEDURE VISIT (OUTPATIENT)
Dept: OBSTETRICS AND GYNECOLOGY | Facility: CLINIC | Age: 33
End: 2018-07-02

## 2018-07-02 ENCOUNTER — ROUTINE PRENATAL (OUTPATIENT)
Dept: OBSTETRICS AND GYNECOLOGY | Facility: CLINIC | Age: 33
End: 2018-07-02

## 2018-07-02 VITALS — DIASTOLIC BLOOD PRESSURE: 82 MMHG | BODY MASS INDEX: 41.9 KG/M2 | SYSTOLIC BLOOD PRESSURE: 128 MMHG | WEIGHT: 292 LBS

## 2018-07-02 DIAGNOSIS — F12.10 MILD TETRAHYDROCANNABINOL (THC) ABUSE: Primary | ICD-10-CM

## 2018-07-02 DIAGNOSIS — Z36.89 ENCOUNTER FOR FETAL ANATOMIC SURVEY: Primary | ICD-10-CM

## 2018-07-02 PROCEDURE — 99213 OFFICE O/P EST LOW 20 MIN: CPT | Performed by: OBSTETRICS & GYNECOLOGY

## 2018-07-02 PROCEDURE — 76805 OB US >/= 14 WKS SNGL FETUS: CPT | Performed by: OBSTETRICS & GYNECOLOGY

## 2018-07-02 RX ORDER — NICOTINE 21 MG/24HR
1 PATCH, TRANSDERMAL 24 HOURS TRANSDERMAL EVERY 24 HOURS
Qty: 30 PATCH | Refills: 3 | Status: SHIPPED | OUTPATIENT
Start: 2018-07-02 | End: 2020-11-23

## 2018-07-02 NOTE — PROGRESS NOTES
CC: OB OFFICE VISIT    Here for anatomy scan:  Wnl.  Reviewed w/ pt: SUBOPTIMAL VISUALIZATION DUE TO FETAL POSITION AND PATIENT HABITUS.  CLEAR VISUALIZATION IS EXTREMELY LIMITED.  MALE FETUS.  ANATOMIC SURVEY IS NORMAL WITHIN THE LIMITATIONS OF ULTRASOUND, FETAL POSITION, GESTATIONAL  AGE AND PATIENT HABITUS.  INTERVAL FETAL GROWTH NOTED. AMNIOTIC FLUID VOLUME IS APPROPRIATE.       HISTORY:  Sarita Duncan is a 32 y.o.  21w1d being seen today for her obstetrical visit. This is a recurrent problem.The problem has been unchanged.    HPI:  Patient reports nausea and vomiting . Fetal movement: normal. Pt denies shortness of breath, chest pain, visual changes, vaginal bleeding, lower extremity swelling, headaches or rashes.        Review of Systems: Pt denies visual changes, headaches, shortness of breath, chest pain, esophageal reflux, gastric pain, nausea and vomiting, diarrhea, rashes, vaginal bleeding, edema, hip pain, pelvic pressure.     PFSH:   Past Medical History:   Diagnosis Date   • Asthma    • COPD (chronic obstructive pulmonary disease)    • Histoplasmosis    • Hypothyroid    • Reactive airway disease      SMOKER? yes Ready to quit: Not Answered  Counseling given: Not Answered    During this visit, I spent > 3-10 minutes counseling the patient regarding smoking cessation. PT COUNSELED TO QUIT SMOKING IN PREGNANCY.  (Cigarette smoking is associated with increased incidence of  birth, PPROM, growth restriction, SIDS, ear infections. Smoking cessation is the single most important thing she can do to improve the pregnancy outcome.) PT COUNSELED TO QUIT SMOKING IN PREGNANCY.  (Cigarette smoking is associated with increased incidence of IUGR, PROM, PTL, PTD, SIDS, asthma, and ear infections.  . Smoking cessation is the single most important thing she can do to improve the pregnancy outcome.)     ALCOHOL? no  ILLICIT DRUGS? + hx THC.  Counseled.     EXAM: AS ABOVE IN FLOW SHEET  /82   Wt 132 kg  (292 lb)   LMP 2018   BMI 41.90 kg/m²     MEDICAL DECISION MAKING:     DIAGNOSES:  32 y.o.  21w1d  Patient Active Problem List   Diagnosis   • Mild tetrahydrocannabinol (THC) abuse   • Nausea and vomiting during pregnancy prior to 22 weeks gestation   • Hypothyroidism     NEW PROBLEMS? no    Data Review: UA & u/s  ANT? no  Lab Results (last 24 hours)     ** No results found for the last 24 hours. **          There are no diagnoses linked to this encounter.  Pregnancy Assessment : High Risk Pregnancy smoker, hyperemesis.  Tests ordered for this or next visit: ULTRASOUND FOR dates  New Meds: no    Time: More than 50% of time spent in counseling and/or coordination of care:  Total face-to-face/floor time 20 min.  Time spent in counseling 15 min. Counseling included the following topics with prognosis, differential diagnosis, risks, benefits of treatment, instructions, complicance and/or risk reduction and alternatives: smoking, THC use, management of hyperemsis.      No Follow-up on file.    Leonel Seth MD  2018  11:38 AM

## 2018-07-16 ENCOUNTER — TELEPHONE (OUTPATIENT)
Dept: OBSTETRICS AND GYNECOLOGY | Facility: CLINIC | Age: 33
End: 2018-07-16

## 2018-07-16 NOTE — TELEPHONE ENCOUNTER
Patient requesting refill on her Ondansetron  4 mg tablets  She has a appointment for yearly 8/1/2018

## 2018-07-18 ENCOUNTER — TELEPHONE (OUTPATIENT)
Dept: OBSTETRICS AND GYNECOLOGY | Facility: CLINIC | Age: 33
End: 2018-07-18

## 2018-07-18 RX ORDER — ONDANSETRON 4 MG/1
4 TABLET, FILM COATED ORAL EVERY 4 HOURS PRN
Qty: 30 TABLET | Refills: 0 | Status: SHIPPED | OUTPATIENT
Start: 2018-07-18 | End: 2018-08-14 | Stop reason: SDUPTHER

## 2018-07-18 RX ORDER — ONDANSETRON 4 MG/1
TABLET, FILM COATED ORAL
Qty: 30 TABLET | Refills: 0 | Status: SHIPPED | OUTPATIENT
Start: 2018-07-18 | End: 2018-07-18 | Stop reason: SDUPTHER

## 2018-08-09 ENCOUNTER — ROUTINE PRENATAL (OUTPATIENT)
Dept: OBSTETRICS AND GYNECOLOGY | Facility: CLINIC | Age: 33
End: 2018-08-09

## 2018-08-09 VITALS — DIASTOLIC BLOOD PRESSURE: 82 MMHG | SYSTOLIC BLOOD PRESSURE: 128 MMHG | BODY MASS INDEX: 42.13 KG/M2 | WEIGHT: 293 LBS

## 2018-08-09 DIAGNOSIS — Z34.92 NORMAL PREGNANCY IN SECOND TRIMESTER: Primary | ICD-10-CM

## 2018-08-09 DIAGNOSIS — E03.9 HYPOTHYROIDISM, UNSPECIFIED TYPE: ICD-10-CM

## 2018-08-09 PROCEDURE — 99213 OFFICE O/P EST LOW 20 MIN: CPT | Performed by: OBSTETRICS & GYNECOLOGY

## 2018-08-09 NOTE — PROGRESS NOTES
OB follow up     Sarita Duncan is a 32 y.o.  26w4d being seen today for her obstetrical visit.  Patient reports no bleeding, no contractions and no leaking. Fetal movement: normal.  Currently on Synthroid for her hypothyroidism.  This is stable condition.    Review of Systems  No bleeding, No cramping/contractions     /82   Wt 133 kg (293 lb 9.6 oz)   LMP 2018   BMI 42.13 kg/m²     FHT: present BPM   Uterine Size: 0 cm   Fundal height not able to be measured due to patient size.    Assessment/Plan:    1) 32 y.o.  -pregnancy at 26w4d    2)   Encounter Diagnoses   Name Primary?   • Normal pregnancy in second trimester Yes   • Hypothyroidism, unspecified type    Two-hour GTT, ultrasound for growth at next visit.    3) Reviewed this stage of pregnancy  4) Problem list updated     Return in about 2 weeks (around 2018) for US, Growth, 2 HR GTT, OB Tummy.      Ashu Smith MD    2018  11:15 AM

## 2018-08-15 RX ORDER — ONDANSETRON 4 MG/1
TABLET, FILM COATED ORAL
Qty: 30 TABLET | Refills: 0 | Status: SHIPPED | OUTPATIENT
Start: 2018-08-15 | End: 2020-11-23

## 2018-08-22 ENCOUNTER — PROCEDURE VISIT (OUTPATIENT)
Dept: OBSTETRICS AND GYNECOLOGY | Facility: CLINIC | Age: 33
End: 2018-08-22

## 2018-08-22 ENCOUNTER — ROUTINE PRENATAL (OUTPATIENT)
Dept: OBSTETRICS AND GYNECOLOGY | Facility: CLINIC | Age: 33
End: 2018-08-22

## 2018-08-22 VITALS — BODY MASS INDEX: 42.33 KG/M2 | DIASTOLIC BLOOD PRESSURE: 82 MMHG | SYSTOLIC BLOOD PRESSURE: 122 MMHG | WEIGHT: 293 LBS

## 2018-08-22 DIAGNOSIS — E03.9 HYPOTHYROIDISM, UNSPECIFIED TYPE: ICD-10-CM

## 2018-08-22 DIAGNOSIS — F17.200 SMOKER: ICD-10-CM

## 2018-08-22 DIAGNOSIS — Z34.80 PRENATAL CARE OF MULTIGRAVIDA, ANTEPARTUM: Primary | ICD-10-CM

## 2018-08-22 DIAGNOSIS — Z23 NEED FOR TDAP VACCINATION: ICD-10-CM

## 2018-08-22 DIAGNOSIS — Z13.1 ENCOUNTER FOR SCREENING FOR DIABETES MELLITUS: ICD-10-CM

## 2018-08-22 DIAGNOSIS — F12.10 MILD TETRAHYDROCANNABINOL (THC) ABUSE: ICD-10-CM

## 2018-08-22 DIAGNOSIS — R63.8 INCREASED BMI (BODY MASS INDEX): ICD-10-CM

## 2018-08-22 DIAGNOSIS — Z87.51 HISTORY OF PRETERM DELIVERY: ICD-10-CM

## 2018-08-22 DIAGNOSIS — Z98.891 PREVIOUS CESAREAN SECTION: ICD-10-CM

## 2018-08-22 DIAGNOSIS — Z36.89 ENCOUNTER FOR ULTRASOUND TO CHECK FETAL GROWTH: Primary | ICD-10-CM

## 2018-08-22 LAB
GLUCOSE 1H P 75 G GLC PO SERPL-MCNC: 87 MG/DL (ref 40–300)
GLUCOSE 2H P 75 G GLC PO SERPL-MCNC: 53 MG/DL (ref 40–300)
GLUCOSE P FAST SERPL-MCNC: 70 MG/DL (ref 40–300)
HCT VFR BLD AUTO: 43.2 % (ref 35.6–45.5)
HGB BLD-MCNC: 13.5 G/DL (ref 11.9–15.5)
T4 FREE SERPL-MCNC: 0.73 NG/DL (ref 0.93–1.7)
TSH SERPL DL<=0.005 MIU/L-ACNC: 6.41 MIU/ML (ref 0.27–4.2)

## 2018-08-22 PROCEDURE — 90471 IMMUNIZATION ADMIN: CPT | Performed by: OBSTETRICS & GYNECOLOGY

## 2018-08-22 PROCEDURE — 90715 TDAP VACCINE 7 YRS/> IM: CPT | Performed by: OBSTETRICS & GYNECOLOGY

## 2018-08-22 PROCEDURE — 99406 BEHAV CHNG SMOKING 3-10 MIN: CPT | Performed by: OBSTETRICS & GYNECOLOGY

## 2018-08-22 PROCEDURE — 99214 OFFICE O/P EST MOD 30 MIN: CPT | Performed by: OBSTETRICS & GYNECOLOGY

## 2018-08-22 PROCEDURE — 76816 OB US FOLLOW-UP PER FETUS: CPT | Performed by: OBSTETRICS & GYNECOLOGY

## 2018-08-22 NOTE — PROGRESS NOTES
OB follow up     Sarita Duncan is a 32 y.o.  28w3d being seen today for her obstetrical visit.  Patient reports no complaints. Fetal movement: normal.    Review of Systems  No bleeding, No cramping/contractions     /82   Wt 134 kg (295 lb)   LMP 2018   BMI 42.33 kg/m²     FHT:   155 BPM   Uterine Size:    32 cms       Assessment/Plan:    1) 32 y.o.  -pregnancy at 28w3d- 2 hour GTT today. Rh +    2) Tdap today    3) Growth US today shows interval growth since last US on 18. 54%, TERESA 18 cms.     4) Check C/G today, never done.    5) Hypothyroidism- on Levothyroxine 100 mcg, check TSH.     6) Smoker- smoking a pack a day. Patient was counseled for 3-10 minutes regarding effects of smoking in pregnancy, including risk of poor growth,  birth and stillbirth as well as an increase in asthma and ear infections in infancy.  Patient did not voice a willingness to quit and information was given for review. She has nicotine patches at home and we made a goal of < 1/2 PPD before her next visit.     7) Previous C/S x 3- plan RTLCS @ 39 week. Pt does NOT want a BTO.    8)H/P PTD @ 36 weeks- pt had active labor at 36 weeks.    9)+ UDS THC- pt counseled, has stopped now but continued use until second trimester. Advise her to not use marijuana in pregnancy.     10)Reviewed this stage of pregnancy    11)Problem list updated     12) RTO 2 weeks OBT.       Rosa De Guzman MD    2018  10:38 AM

## 2018-08-23 NOTE — PROGRESS NOTES
PIP= normal 2 hour GTT. Her thyroid is abnormal but she has been off her meds for at least a week due to a recall. Recheck thyroid panel in 1 month

## 2018-08-25 LAB
C TRACH RRNA SPEC QL NAA+PROBE: NEGATIVE
N GONORRHOEA RRNA SPEC QL NAA+PROBE: NEGATIVE
T VAGINALIS RRNA SPEC QL NAA+PROBE: NEGATIVE

## 2018-09-07 ENCOUNTER — HOSPITAL ENCOUNTER (OUTPATIENT)
Facility: HOSPITAL | Age: 33
Discharge: HOME | End: 2018-09-07
Attending: OBSTETRICS & GYNECOLOGY | Admitting: OBSTETRICS & GYNECOLOGY

## 2018-09-07 ENCOUNTER — ROUTINE PRENATAL (OUTPATIENT)
Dept: OBSTETRICS AND GYNECOLOGY | Facility: CLINIC | Age: 33
End: 2018-09-07

## 2018-09-07 VITALS — SYSTOLIC BLOOD PRESSURE: 120 MMHG | BODY MASS INDEX: 41.75 KG/M2 | DIASTOLIC BLOOD PRESSURE: 74 MMHG | WEIGHT: 291 LBS

## 2018-09-07 VITALS
SYSTOLIC BLOOD PRESSURE: 131 MMHG | WEIGHT: 291 LBS | RESPIRATION RATE: 18 BRPM | TEMPERATURE: 96.7 F | DIASTOLIC BLOOD PRESSURE: 90 MMHG | BODY MASS INDEX: 41.66 KG/M2 | HEART RATE: 98 BPM | HEIGHT: 70 IN

## 2018-09-07 DIAGNOSIS — E03.8 OTHER SPECIFIED HYPOTHYROIDISM: ICD-10-CM

## 2018-09-07 DIAGNOSIS — F17.200 SMOKER: ICD-10-CM

## 2018-09-07 DIAGNOSIS — Z34.93 PRENATAL CARE IN THIRD TRIMESTER: Primary | ICD-10-CM

## 2018-09-07 DIAGNOSIS — Z98.891 PREVIOUS CESAREAN SECTION: ICD-10-CM

## 2018-09-07 LAB
AMPHET+METHAMPHET UR QL: NEGATIVE
AMPHETAMINES UR QL: NEGATIVE
BARBITURATES UR QL SCN: NEGATIVE
BENZODIAZ UR QL SCN: NEGATIVE
BILIRUB UR QL STRIP: NEGATIVE
BUPRENORPHINE SERPL-MCNC: NEGATIVE NG/ML
CANNABINOIDS SERPL QL: POSITIVE
CLARITY UR: CLEAR
COCAINE UR QL: NEGATIVE
COLOR UR: YELLOW
GLUCOSE UR STRIP-MCNC: NEGATIVE MG/DL
HGB UR QL STRIP.AUTO: NEGATIVE
KETONES UR QL STRIP: ABNORMAL
LEUKOCYTE ESTERASE UR QL STRIP.AUTO: NEGATIVE
METHADONE UR QL SCN: NEGATIVE
NITRITE UR QL STRIP: NEGATIVE
OPIATES UR QL: NEGATIVE
OXYCODONE UR QL SCN: NEGATIVE
PCP UR QL SCN: NEGATIVE
PH UR STRIP.AUTO: 6.5 [PH] (ref 4.5–8)
PROPOXYPH UR QL: NEGATIVE
PROT UR QL STRIP: NEGATIVE
SP GR UR STRIP: 1.02 (ref 1–1.03)
TRICYCLICS UR QL SCN: NEGATIVE
UROBILINOGEN UR QL STRIP: ABNORMAL

## 2018-09-07 PROCEDURE — 81003 URINALYSIS AUTO W/O SCOPE: CPT | Performed by: OBSTETRICS & GYNECOLOGY

## 2018-09-07 PROCEDURE — 99406 BEHAV CHNG SMOKING 3-10 MIN: CPT | Performed by: NURSE PRACTITIONER

## 2018-09-07 PROCEDURE — 59025 FETAL NON-STRESS TEST: CPT | Performed by: OBSTETRICS & GYNECOLOGY

## 2018-09-07 PROCEDURE — 80306 DRUG TEST PRSMV INSTRMNT: CPT | Performed by: OBSTETRICS & GYNECOLOGY

## 2018-09-07 PROCEDURE — G0463 HOSPITAL OUTPT CLINIC VISIT: HCPCS

## 2018-09-07 PROCEDURE — 99213 OFFICE O/P EST LOW 20 MIN: CPT | Performed by: NURSE PRACTITIONER

## 2018-09-07 PROCEDURE — 59025 FETAL NON-STRESS TEST: CPT

## 2018-09-07 NOTE — NON STRESS TEST
Sarita Duncan, a  at 30w5d with an HAYES of 2018, by Ultrasound, was seen at [facility] for a nonstress test.    Chief Complaint   Patient presents with   • Contractions       Patient Active Problem List   Diagnosis   • Mild tetrahydrocannabinol (THC) abuse, counseled AKR 18   • Nausea and vomiting during pregnancy prior to 22 weeks gestation   • Hypothyroidism- on Levothyroixine 100 mcg, check TSH each trimester   • Smoker   • Previous  section x 3- plan RLTCS at 39 weeks   • History of  delivery- went into labor at 36 weeks in last pregnancy       Start Time: 1216  Stop Time: 1310    Interpretation A  Nonstress Test Interpretation A: Reactive (18 1245 : Jigna Bernal, JOHN PAUL)

## 2018-09-07 NOTE — PROGRESS NOTES
OB follow up > 20 weeks    Chief Complaint   Patient presents with   • Routine Prenatal Visit       Sarita Duncan is a 32 y.o.  30w5d being seen today for her obstetrical visit.  Patient reports pelvic pressure, cramping and back pain. She has a history of  delivery. . Fetal movement: normal. +PNV.      Review of Systems  No bleeding. No leaking of fluid. Good fetal movement.       /74   Wt 132 kg (291 lb)   LMP 2018   BMI 41.75 kg/m²     FHT: 140s  BPM   Uterine Size: size equals dates       Assessment    1) pregnancy at 30w5d   2) S[facility]L- [facility].   3) S/P Tdap  4)  Hypothyroidism- TSH abnormal= 6.41mIU/mL. Taking levothyroxine 100mcg daily. Recheck at next appt.   5) Smoker- smoking a pack a day. Patient was counseled for 3-10 minutes regarding effects of smoking in pregnancy, including risk of poor growth,  birth and stillbirth as well as an increase in asthma and ear infections in infancy.  Patient did not voice a willingness to quit and information was given for review. She has nicotine patches at home and we made a goal of < 1/2 PPD before her next visit.    6) Previous C/S x 3- plan RTLCS @ 39 week. Pt does NOT want a BTO.  7)H/P PTD @ 36 weeks- pt had active labor at 36 weeks.    Plan    Continue prenatal vitamins  Reviewed this stage of pregnancy  Problem list updated   Follow up in 2 weeks    LISBETH Miller  2018  2:17 PM

## 2018-09-20 ENCOUNTER — ROUTINE PRENATAL (OUTPATIENT)
Dept: OBSTETRICS AND GYNECOLOGY | Facility: CLINIC | Age: 33
End: 2018-09-20

## 2018-09-20 VITALS — SYSTOLIC BLOOD PRESSURE: 130 MMHG | BODY MASS INDEX: 41.61 KG/M2 | WEIGHT: 290 LBS | DIASTOLIC BLOOD PRESSURE: 80 MMHG

## 2018-09-20 DIAGNOSIS — E03.8 OTHER SPECIFIED HYPOTHYROIDISM: ICD-10-CM

## 2018-09-20 DIAGNOSIS — Z98.891 PREVIOUS CESAREAN SECTION: ICD-10-CM

## 2018-09-20 DIAGNOSIS — Z87.51 HISTORY OF PRETERM DELIVERY: Primary | ICD-10-CM

## 2018-09-20 PROCEDURE — 99213 OFFICE O/P EST LOW 20 MIN: CPT | Performed by: OBSTETRICS & GYNECOLOGY

## 2018-09-20 NOTE — PROGRESS NOTES
OB follow up     Sarita Duncan is a 32 y.o.  32w4d being seen today for her obstetrical visit.  Patient reports no bleeding, no contractions and no leaking. Fetal movement: normal.  Prenatal care is complicated by hypothyroidism, previous  deliveries ×3, morbid obesity and a history of  birth.  She takes her thyroid medicine daily and we have checked her TSH every trimester.  We plan for repeat low-transverse  section at 39 weeks was  labor occurs.    Review of Systems  No bleeding, No cramping/contractions     /80   Wt 132 kg (290 lb)   LMP 2018   BMI 41.61 kg/m²     FHT: present BPM   Uterine Size:     Awake alert and oriented ×3.  Abdomen soft, gravid, fundal height not reasonably checked due to patient habitus.  Fetal heart rate is present.    Assessment/Plan:    1) 32 y.o.  -pregnancy at 32w4d    2)   Encounter Diagnoses   Name Primary?   • History of  delivery- went into labor at 36 weeks in last pregnancy Yes   • Previous  section x 3- plan RLTCS at 39 weeks    • Other specified hypothyroidism    We'll go ahead and schedule her 39 week repeat low-transverse  section.   labor warnings given.  Continue oral thyroid medication.    3) Reviewed this stage of pregnancy  4) Problem list updated     Return in about 2 weeks (around 10/4/2018) for OB Tummy.      Ashu Smith MD    2018  10:35 AM

## 2018-10-04 ENCOUNTER — ROUTINE PRENATAL (OUTPATIENT)
Dept: OBSTETRICS AND GYNECOLOGY | Facility: CLINIC | Age: 33
End: 2018-10-04

## 2018-10-04 VITALS — SYSTOLIC BLOOD PRESSURE: 130 MMHG | DIASTOLIC BLOOD PRESSURE: 90 MMHG | BODY MASS INDEX: 41.75 KG/M2 | WEIGHT: 291 LBS

## 2018-10-04 DIAGNOSIS — Z98.891 PREVIOUS CESAREAN SECTION: Primary | ICD-10-CM

## 2018-10-04 DIAGNOSIS — O09.293 HX OF PREECLAMPSIA, PRIOR PREGNANCY, CURRENTLY PREGNANT, THIRD TRIMESTER: ICD-10-CM

## 2018-10-04 DIAGNOSIS — E03.8 OTHER SPECIFIED HYPOTHYROIDISM: ICD-10-CM

## 2018-10-04 PROCEDURE — 99213 OFFICE O/P EST LOW 20 MIN: CPT | Performed by: OBSTETRICS & GYNECOLOGY

## 2018-10-04 NOTE — PROGRESS NOTES
OB follow up     Sarita Duncan is a 33 y.o.  34w4d being seen today for her obstetrical visit.  Patient reports no bleeding, no contractions and no leaking. Fetal movement: normal.  History of preeclampsia with 3 prior pregnancies.  History of  labor as well with prior pregnancy. No HA or visual changes.    Review of Systems  No bleeding, No cramping/contractions     /90   Wt 132 kg (291 lb)   LMP 2018   BMI 41.75 kg/m²     FHT: present BPM   Uterine Size:         Assessment/Plan:    1) 33 y.o.  -pregnancy at 34w4d    2)   Encounter Diagnoses   Name Primary?   • Previous  section x 3- plan RLTCS at 39 weeks Yes   • Other specified hypothyroidism    • Hx of preeclampsia, prior pregnancy, currently pregnant, third trimester        3) Reviewed this stage of pregnancy  4) Problem list updated     Return in about 2 weeks (around 10/18/2018) for OB INT, GBS.      Ashu Smith MD    10/4/2018  10:24 AM

## 2018-10-10 ENCOUNTER — HOSPITAL ENCOUNTER (OUTPATIENT)
Facility: HOSPITAL | Age: 33
Setting detail: OBSERVATION
Discharge: HOME | End: 2018-10-11
Attending: OBSTETRICS & GYNECOLOGY | Admitting: OBSTETRICS & GYNECOLOGY

## 2018-10-10 LAB
ALBUMIN SERPL-MCNC: 3.1 G/DL (ref 3.5–5.2)
ALBUMIN/GLOB SERPL: 0.9 G/DL
ALP SERPL-CCNC: 98 U/L (ref 40–129)
ALT SERPL W P-5'-P-CCNC: 6 U/L (ref 5–33)
AMPHET+METHAMPHET UR QL: NEGATIVE
AMPHETAMINES UR QL: NEGATIVE
ANION GAP SERPL CALCULATED.3IONS-SCNC: 12.2 MMOL/L
AST SERPL-CCNC: 10 U/L (ref 5–32)
BARBITURATES UR QL SCN: NEGATIVE
BASOPHILS # BLD AUTO: 0.05 10*3/MM3 (ref 0–0.2)
BASOPHILS NFR BLD AUTO: 0.4 % (ref 0–2)
BENZODIAZ UR QL SCN: NEGATIVE
BILIRUB SERPL-MCNC: <0.2 MG/DL (ref 0.2–1.2)
BUN BLD-MCNC: 4 MG/DL (ref 6–20)
BUN/CREAT SERPL: 8.9 (ref 7–25)
BUPRENORPHINE SERPL-MCNC: NEGATIVE NG/ML
CALCIUM SPEC-SCNC: 8.7 MG/DL (ref 8.6–10.5)
CANNABINOIDS SERPL QL: POSITIVE
CHLORIDE SERPL-SCNC: 102 MMOL/L (ref 98–107)
CO2 SERPL-SCNC: 20.8 MMOL/L (ref 22–29)
COCAINE UR QL: NEGATIVE
CREAT BLD-MCNC: 0.45 MG/DL (ref 0.57–1)
DEPRECATED RDW RBC AUTO: 41.9 FL (ref 37–54)
EOSINOPHIL # BLD AUTO: 0.11 10*3/MM3 (ref 0.1–0.3)
EOSINOPHIL NFR BLD AUTO: 0.8 % (ref 0–4)
ERYTHROCYTE [DISTWIDTH] IN BLOOD BY AUTOMATED COUNT: 12.9 % (ref 11.5–14.5)
EXPIRATION DATE: ABNORMAL
GFR SERPL CREATININE-BSD FRML MDRD: >150 ML/MIN/1.73
GLOBULIN UR ELPH-MCNC: 3.5 GM/DL
GLUCOSE BLD-MCNC: 88 MG/DL (ref 65–99)
HCT VFR BLD AUTO: 39 % (ref 37–47)
HGB BLD-MCNC: 13.4 G/DL (ref 12–16)
IMM GRANULOCYTES # BLD: 0.04 10*3/MM3 (ref 0–0.03)
IMM GRANULOCYTES NFR BLD: 0.3 % (ref 0–0.5)
LYMPHOCYTES # BLD AUTO: 2.69 10*3/MM3 (ref 0.6–4.8)
LYMPHOCYTES NFR BLD AUTO: 20.6 % (ref 20–45)
Lab: ABNORMAL
MCH RBC QN AUTO: 31.4 PG (ref 27–31)
MCHC RBC AUTO-ENTMCNC: 34.4 G/DL (ref 31–37)
MCV RBC AUTO: 91.3 FL (ref 81–99)
METHADONE UR QL SCN: NEGATIVE
MONOCYTES # BLD AUTO: 0.83 10*3/MM3 (ref 0–1)
MONOCYTES NFR BLD AUTO: 6.4 % (ref 3–8)
NEUTROPHILS # BLD AUTO: 9.35 10*3/MM3 (ref 1.5–8.3)
NEUTROPHILS NFR BLD AUTO: 71.5 % (ref 45–70)
NRBC BLD MANUAL-RTO: 0 /100 WBC (ref 0–0)
OPIATES UR QL: NEGATIVE
OXYCODONE UR QL SCN: NEGATIVE
PCP UR QL SCN: NEGATIVE
PLATELET # BLD AUTO: 229 10*3/MM3 (ref 140–500)
PMV BLD AUTO: 10.5 FL (ref 7.4–10.4)
POTASSIUM BLD-SCNC: 3.8 MMOL/L (ref 3.5–5.2)
PROPOXYPH UR QL: NEGATIVE
PROT SERPL-MCNC: 6.6 G/DL (ref 6–8.5)
PROT UR STRIP-MCNC: ABNORMAL MG/DL
RBC # BLD AUTO: 4.27 10*6/MM3 (ref 4.2–5.4)
SODIUM BLD-SCNC: 135 MMOL/L (ref 136–145)
TRICYCLICS UR QL SCN: NEGATIVE
URATE SERPL-MCNC: 3.6 MG/DL (ref 3.4–7)
WBC NRBC COR # BLD: 13.07 10*3/MM3 (ref 4.8–10.8)

## 2018-10-10 PROCEDURE — 85025 COMPLETE CBC W/AUTO DIFF WBC: CPT | Performed by: OBSTETRICS & GYNECOLOGY

## 2018-10-10 PROCEDURE — 81002 URINALYSIS NONAUTO W/O SCOPE: CPT | Performed by: OBSTETRICS & GYNECOLOGY

## 2018-10-10 PROCEDURE — 80053 COMPREHEN METABOLIC PANEL: CPT | Performed by: OBSTETRICS & GYNECOLOGY

## 2018-10-10 PROCEDURE — 84550 ASSAY OF BLOOD/URIC ACID: CPT | Performed by: OBSTETRICS & GYNECOLOGY

## 2018-10-10 PROCEDURE — 80306 DRUG TEST PRSMV INSTRMNT: CPT | Performed by: OBSTETRICS & GYNECOLOGY

## 2018-10-10 RX ORDER — HYDROCODONE BITARTRATE AND ACETAMINOPHEN 5; 325 MG/1; MG/1
TABLET ORAL
Status: COMPLETED
Start: 2018-10-10 | End: 2018-10-10

## 2018-10-10 RX ORDER — LABETALOL 100 MG/1
TABLET, FILM COATED ORAL
Status: COMPLETED
Start: 2018-10-10 | End: 2018-10-10

## 2018-10-10 RX ORDER — LABETALOL 100 MG/1
100 TABLET, FILM COATED ORAL EVERY 12 HOURS SCHEDULED
Status: DISCONTINUED | OUTPATIENT
Start: 2018-10-10 | End: 2018-10-11 | Stop reason: HOSPADM

## 2018-10-10 RX ORDER — HYDROCODONE BITARTRATE AND ACETAMINOPHEN 5; 325 MG/1; MG/1
2 TABLET ORAL ONCE
Status: COMPLETED | OUTPATIENT
Start: 2018-10-10 | End: 2018-10-10

## 2018-10-10 RX ORDER — ACETAMINOPHEN 500 MG
1000 TABLET ORAL EVERY 6 HOURS PRN
Status: DISCONTINUED | OUTPATIENT
Start: 2018-10-10 | End: 2018-10-11 | Stop reason: HOSPADM

## 2018-10-10 RX ADMIN — LABETALOL HYDROCHLORIDE 100 MG: 100 TABLET, FILM COATED ORAL at 19:58

## 2018-10-10 RX ADMIN — HYDROCODONE BITARTRATE AND ACETAMINOPHEN 2 TABLET: 5; 325 TABLET ORAL at 19:58

## 2018-10-11 VITALS
RESPIRATION RATE: 20 BRPM | HEIGHT: 70 IN | BODY MASS INDEX: 41.66 KG/M2 | OXYGEN SATURATION: 98 % | WEIGHT: 291 LBS | TEMPERATURE: 97.6 F | SYSTOLIC BLOOD PRESSURE: 128 MMHG | DIASTOLIC BLOOD PRESSURE: 90 MMHG | HEART RATE: 84 BPM

## 2018-10-11 PROBLEM — O21.9 NAUSEA AND VOMITING DURING PREGNANCY PRIOR TO 22 WEEKS GESTATION: Status: RESOLVED | Noted: 2018-05-10 | Resolved: 2018-10-11

## 2018-10-11 PROBLEM — Z34.90 PREGNANCY: Status: ACTIVE | Noted: 2018-10-11

## 2018-10-11 PROBLEM — O16.3 ELEVATED BLOOD PRESSURE AFFECTING PREGNANCY IN THIRD TRIMESTER, ANTEPARTUM: Status: ACTIVE | Noted: 2018-10-11

## 2018-10-11 LAB
COLLECT DURATION TIME UR: 24 HRS
PROT 24H UR-MRATE: 216 MG/24HOURS (ref 28–141)
PROT UR-MCNC: 18 MG/DL
SPECIMEN VOL 24H UR: 1200 ML

## 2018-10-11 PROCEDURE — 99225 PR SBSQ OBSERVATION CARE/DAY 25 MINUTES: CPT | Performed by: NURSE PRACTITIONER

## 2018-10-11 PROCEDURE — 59025 FETAL NON-STRESS TEST: CPT | Performed by: OBSTETRICS & GYNECOLOGY

## 2018-10-11 PROCEDURE — 84156 ASSAY OF PROTEIN URINE: CPT | Performed by: OBSTETRICS & GYNECOLOGY

## 2018-10-11 PROCEDURE — 81050 URINALYSIS VOLUME MEASURE: CPT | Performed by: OBSTETRICS & GYNECOLOGY

## 2018-10-11 PROCEDURE — 99406 BEHAV CHNG SMOKING 3-10 MIN: CPT | Performed by: NURSE PRACTITIONER

## 2018-10-11 PROCEDURE — 25010000002 PNEUMOCOCCAL VAC POLYVALENT PER 0.5 ML: Performed by: OBSTETRICS & GYNECOLOGY

## 2018-10-11 PROCEDURE — G0378 HOSPITAL OBSERVATION PER HR: HCPCS

## 2018-10-11 PROCEDURE — G0009 ADMIN PNEUMOCOCCAL VACCINE: HCPCS | Performed by: OBSTETRICS & GYNECOLOGY

## 2018-10-11 PROCEDURE — 90732 PPSV23 VACC 2 YRS+ SUBQ/IM: CPT | Performed by: OBSTETRICS & GYNECOLOGY

## 2018-10-11 PROCEDURE — 59025 FETAL NON-STRESS TEST: CPT

## 2018-10-11 RX ORDER — LEVOTHYROXINE SODIUM 0.1 MG/1
100 TABLET ORAL
Status: DISCONTINUED | OUTPATIENT
Start: 2018-10-11 | End: 2018-10-11 | Stop reason: HOSPADM

## 2018-10-11 RX ORDER — LABETALOL 100 MG/1
100 TABLET, FILM COATED ORAL EVERY 12 HOURS SCHEDULED
Qty: 60 TABLET | Refills: 1 | Status: SHIPPED | OUTPATIENT
Start: 2018-10-11 | End: 2020-11-23

## 2018-10-11 RX ADMIN — LABETALOL HYDROCHLORIDE 100 MG: 100 TABLET, FILM COATED ORAL at 20:05

## 2018-10-11 RX ADMIN — PNEUMOCOCCAL VACCINE POLYVALENT 0.5 ML
25; 25; 25; 25; 25; 25; 25; 25; 25; 25; 25; 25; 25; 25; 25; 25; 25; 25; 25; 25; 25; 25; 25 INJECTION, SOLUTION INTRAMUSCULAR; SUBCUTANEOUS at 09:00

## 2018-10-11 RX ADMIN — LEVOTHYROXINE SODIUM 100 MCG: 100 TABLET ORAL at 10:17

## 2018-10-11 RX ADMIN — LABETALOL HYDROCHLORIDE 100 MG: 100 TABLET, FILM COATED ORAL at 09:03

## 2018-10-11 RX ADMIN — ACETAMINOPHEN 1000 MG: 500 TABLET, FILM COATED ORAL at 00:22

## 2018-10-11 NOTE — DISCHARGE SUMMARY
Date of Discharge:  10/11/2018    Discharge Diagnosis:   Hypertension in pregnancy, not preeclamptic    Problem List:    Pregnancy    Mild tetrahydrocannabinol (THC) abuse, counseled AKR 18    Hypothyroidism- on Levothyroixine 100 mcg, check TSH each trimester    Smoker    Previous  section x 3- plan RLTCS at 39 weeks    History of  delivery- went into labor at 36 weeks in last pregnancy    Hx of preeclampsia, prior pregnancy, currently pregnant, third trimester    Elevated blood pressure affecting pregnancy in third trimester, antepartum      Presenting Problem/History of Present Illness  Elevated blood pressure affecting pregnancy in third trimester, antepartum [O16.3]        Hospital Course  Patient is a 33 y.o. female presented with elevated bps at home yesterday.  Admitted for further evaluation and a 24hr urine was <300mg/24 hr.  Pt's pressures were better and pt felt better.        Procedures Performed         Consults:   Consults     No orders found for last 30 day(s).          Pertinent Test Results:   Lab Results (last 24 hours)     Procedure Component Value Units Date/Time    Protein, Urine, 24 Hour - Urine, Clean Catch [[id number]]  (Abnormal) Collected:  10/11/18 190    Specimen:  24 Hour Urine from Urine, Clean Catch Updated:  10/11/18 1923     Protein, 24H Urine 216.0 (H) mg/24hours      Total Protein, Urine 18.0 mg/dL      24H Urine Volume 1,200 mL      Time (Hours) 24 hrs     Comprehensive Metabolic Panel [[id number]]  (Abnormal) Collected:  10/10/18 2001    Specimen:  Blood Updated:  10/10/18 2027     Glucose 88 mg/dL      BUN 4 (L) mg/dL      Creatinine 0.45 (L) mg/dL      Sodium 135 (L) mmol/L      Potassium 3.8 mmol/L      Chloride 102 mmol/L      CO2 20.8 (L) mmol/L      Calcium 8.7 mg/dL      Total Protein 6.6 g/dL      Albumin 3.10 (L) g/dL      ALT (SGPT) 6 U/L      AST (SGOT) 10 U/L      Alkaline Phosphatase 98 U/L      Total Bilirubin <0.2 (L) mg/dL      eGFR Non   Amer >150 mL/min/1.73      Globulin 3.5 gm/dL      A/G Ratio 0.9 g/dL      BUN/Creatinine Ratio 8.9     Anion Gap 12.2 mmol/L     Uric Acid [[id number]]  (Normal) Collected:  10/10/18 2001    Specimen:  Blood Updated:  10/10/18 2026     Uric Acid 3.6 mg/dL     Urine Drug Screen - Urine, Clean Catch [[id number]]  (Abnormal) Collected:  10/10/18 1927    Specimen:  Urine from Urine, Clean Catch Updated:  10/10/18 2023     THC, Screen, Urine Positive (A)     Phencyclidine (PCP), Urine Negative     Cocaine Screen, Urine Negative     Methamphetamine, Urine Negative     Opiate Screen Negative     Amphetamine Screen, Urine Negative     Benzodiazepine Screen, Urine Negative     Tricyclic Antidepressants Screen Negative     Methadone Screen, Urine Negative     Barbiturates Screen, Urine Negative     Oxycodone Screen, Urine Negative     Propoxyphene Screen Negative     Buprenorphine, Screen, Urine Negative    Narrative:       Urine drug screen results are to be used for medical purposes only.  They are not to be used for legal purposes such as employment testing.  Negative results do not necessarily mean the complete absence of a subtance, but rather that the result is less than the cutoff for that substance.  Positive results are unconfirmed and considered Preliminary Positive.  [facility] does not automatically confirm Postitive Unconfirmed results.  The physician may request (order) an Unconfirmed Positive result to be sent out for confirmation.      Negative Thresholds for Drugs Screened:    THC screen, urine                          50 ng/ml  Phenycyclidine (PCP), urine                25 ng/ml  Cocaine screen, urine                     150 ng/ml  Methamphetamine, urine                    500 ng/ml  Opiate screen, urine                      100 ng/ml  Amphetamine screen, urine                 500 ng/ml  Benzodiazepine screen, urine              150 ng/ml  Tricyclic Antidepressants screen, urine   300  ng/ml  Methadone screen, urine                   200 ng/ml  Barbiturates screen, urine                200 ng/ml  Oxycodone screen, urine                   100 ng/ml  Propoxyphene screen, urine                300 ng/ml  Buprenorphine screen, urine                10 ng/ml    CBC & Differential [[id number]] Collected:  10/10/18 2001    Specimen:  Blood Updated:  10/10/18 2010    Narrative:       The following orders were created for panel order CBC & Differential.  Procedure                               Abnormality         Status                     ---------                               -----------         ------                     CBC Auto Differential[[id number]]        Abnormal            Final result                 Please view results for these tests on the individual orders.    CBC Auto Differential [[id number]]  (Abnormal) Collected:  10/10/18 2001    Specimen:  Blood Updated:  10/10/18 2010     WBC 13.07 (H) 10*3/mm3      RBC 4.27 10*6/mm3      Hemoglobin 13.4 g/dL      Hematocrit 39.0 %      MCV 91.3 fL      MCH 31.4 (H) pg      MCHC 34.4 g/dL      RDW 12.9 %      RDW-SD 41.9 fl      MPV 10.5 (H) fL      Platelets 229 10*3/mm3      Neutrophil % 71.5 (H) %      Lymphocyte % 20.6 %      Monocyte % 6.4 %      Eosinophil % 0.8 %      Basophil % 0.4 %      Immature Grans % 0.3 %      Neutrophils, Absolute 9.35 (H) 10*3/mm3      Lymphocytes, Absolute 2.69 10*3/mm3      Monocytes, Absolute 0.83 10*3/mm3      Eosinophils, Absolute 0.11 10*3/mm3      Basophils, Absolute 0.05 10*3/mm3      Immature Grans, Absolute 0.04 (H) 10*3/mm3      nRBC 0.0 /100 WBC           Condition on Discharge:  satis    Vital Signs  Temp:  [97.5 °F (36.4 °C)-98.5 °F (36.9 °C)] 97.5 °F (36.4 °C)  Heart Rate:  [78-90] 78  Resp:  [18-20] 20  BP: (125-146)/(82-91) 135/87    Discharge Disposition  Home or Self Care    Discharge Medications     Discharge Medications      New Medications      Instructions Start Date   labetalol 100 MG  tablet  Commonly known as:  NORMODYNE   100 mg, Oral, Every 12 Hours Scheduled         Continue These Medications      Instructions Start Date   BREO ELLIPTA 100-25 MCG/INH aerosol powder   Generic drug:  Fluticasone Furoate-Vilanterol   No dose, route, or frequency recorded.      ipratropium 0.02 % nebulizer solution  Commonly known as:  ATROVENT   INHALE ONE VIAL IN NEBULIZER Q 4 H IF NEEDED      ipratropium 0.03 % nasal spray  Commonly known as:  ATROVENT   2 sprays, Nasal, Every 4 Hours PRN      ipratropium-albuterol 0.5-2.5 mg/3 ml nebulizer  Commonly known as:  DUO-NEB   USE 1 VIAL IN NEBULIZER Q 6 H PRN      levothyroxine 100 MCG tablet  Commonly known as:  SYNTHROID, LEVOTHROID   100 mcg, Oral, Daily      montelukast 10 MG tablet  Commonly known as:  SINGULAIR   10 mg, Oral, Daily      nicotine 14 MG/24HR patch  Commonly known as:  NICODERM CQ   1 patch, Transdermal, Every 24 Hours      ondansetron 4 MG tablet  Commonly known as:  ZOFRAN   TAKE 1 TABLET BY MOUTH EVERY 4 HOURS AS NEEDED FOR NAUSEA OR VOMITING      VENTOLIN  (90 Base) MCG/ACT inhaler  Generic drug:  albuterol   INL 1 TO 2 PFS PO Q 4 TO 6 H PRN             Discharge Diet   Diet Instructions     Advance Diet As Tolerated             Activity at Discharge  Activity Instructions     Bedrest With Bathroom Privileges       Additional Activity Instructions:      Bedrest                     Follow-up Appointments  Future Appointments  Date Time Provider Department Center   10/23/2018 10:00 AM Ashu Smith MD [facility] None     Additional Instructions for the Follow-ups that You Need to Schedule     Call MD With Problems / Concerns    As directed      Instructions: severe headache, visual changes, severe abdominal or epigastric pain, vaginal bleeding, decreased fetal movement, or elevated bp    Order Comments:  Instructions: severe headache, visual changes, severe abdominal or epigastric pain, vaginal bleeding, decreased fetal  movement, or elevated bp          Discharge Follow-up with Specified Provider: office; 1 Week    As directed      To:  office    Follow Up:  1 Week               Total time spent in discharging pt was 38 minutes, due to topics covered: preeclampsia warnings.  Labor warnings.      Leonel Seth MD  7:52 PM  10/11/2018

## 2018-10-11 NOTE — NON STRESS TEST
Sarita Duncan, a  at 35w4d with an HAYES of 2018, by Ultrasound, was seen at [facility] for a nonstress test.    Chief Complaint   Patient presents with   • Elevated Blood Pressure   • Headache       Patient Active Problem List   Diagnosis   • Mild tetrahydrocannabinol (THC) abuse, counseled AKR 18   • Nausea and vomiting during pregnancy prior to 22 weeks gestation   • Hypothyroidism- on Levothyroixine 100 mcg, check TSH each trimester   • Smoker   • Previous  section x 3- plan RLTCS at 39 weeks   • History of  delivery- went into labor at 36 weeks in last pregnancy   • Hx of preeclampsia, prior pregnancy, currently pregnant, third trimester   • Elevated blood pressure affecting pregnancy in third trimester, antepartum     Patient here to rule of preeclampsia. 24 hour urine in progress. BP's 120/80's to 135/90's. Patient denies contractions, vaginal bleeding, leakage of fluid. Fetal  with moderate variability, no decels. Patient still observation.     Start Time: 165  Stop Time:     Interpretation A  Nonstress Test Interpretation A: Reactive (10/11/18 1727 : Kaele Hassan RN)

## 2018-10-11 NOTE — NON STRESS TEST
Sarita Duncan, a  at 35w4d with an HAYES of 2018, by Ultrasound, was seen at [facility] for a nonstress test.    Chief Complaint   Patient presents with   • Elevated Blood Pressure   • Headache       Patient Active Problem List   Diagnosis   • Mild tetrahydrocannabinol (THC) abuse, counseled AKR 18   • Nausea and vomiting during pregnancy prior to 22 weeks gestation   • Hypothyroidism- on Levothyroixine 100 mcg, check TSH each trimester   • Smoker   • Previous  section x 3- plan RLTCS at 39 weeks   • History of  delivery- went into labor at 36 weeks in last pregnancy   • Hx of preeclampsia, prior pregnancy, currently pregnant, third trimester       Start Time: 528  Stop Time: 0600      Interpretation A  Nonstress Test Interpretation A: Reactive (10/11/18 0600 : , Ghada MORAN, RN)

## 2018-10-11 NOTE — NURSING NOTE
Discharge Planning Assessment  ROBERT Hernandez     Patient Name: Sarita Duncan  MRN: [id number]  Today's Date: 10/11/2018    Admit Date: 10/10/2018          Discharge Needs Assessment     Row Name 10/11/18 1429       Living Environment    Lives With child(estefany), dependent;significant other    Name(s) of Who Lives With Patient Saúl Nails, 3 children - 14, 9 & 5    Current Living Arrangements home/apartment/condo    Primary Care Provided by self    Provides Primary Care For child(estefany)    Family Caregiver if Needed significant other    Quality of Family Relationships helpful;involved;supportive    Able to Return to Prior Arrangements yes       Resource/Environmental Concerns    Resource/Environmental Concerns none       Transition Planning    Patient/Family Anticipates Transition to home with family    Transportation Anticipated family or friend will provide       Discharge Needs Assessment    Readmission Within the Last 30 Days no previous admission in last 30 days    Concerns to be Addressed no discharge needs identified            Discharge Plan     Row Name 10/11/18 5206       Plan    Plan Home    Patient/Family in Agreement with Plan yes    Plan Comments Spoke with patient at bedside.  She lives in a house in [place] with her fiance and 3 children ages 14,9,& 5.  She has nor had home healht, DME or oxygen.  She is independent with her ADL's and drives herself.  Her pharmacy is [facility] in [facility] and there are no issues with paying for her prescriptions.  She does not have an advanced directive and has no interest in such.  Plan is home.  Will continue to follow        Destination     No service coordination in this encounter.      Durable Medical Equipment     No service coordination in this encounter.      Dialysis/Infusion     No service coordination in this encounter.      Home Medical Care     No service coordination in this encounter.      Social Care     No service coordination in this encounter.                 Demographic Summary     Row Name 10/11/18 1427       General Information    Admission Type observation    Arrived From home    Required Notices Provided Observation Status Notice    Referral Source admission list    Reason for Consult discharge planning    Preferred Language English     Used During This Interaction no       Contact Information    Permission Granted to Share Info With             Functional Status    No documentation.           Psychosocial    No documentation.           Abuse/Neglect    No documentation.           Legal    No documentation.           Substance Abuse    No documentation.           Patient Forms    No documentation.         Alanna Gusman RN

## 2018-10-11 NOTE — PROGRESS NOTES
"    Patient Care Team:  Rosa Nuñez APRN as PCP - General (Family Medicine)  Ashu Smith MD as PCP - Claims Attributed    Chief complaint   Chief Complaint   Patient presents with   • Elevated Blood Pressure   • Headache       33 y.o.  w/ Patient's last menstrual period was 2018.     HPI: Ms. Duncan is a 34 y/o  @ 35.4 weeks gestation. She has a known history of preeclampsia with her prior pregnancies. She has been monitoring her BPs at home. She reports yesterday her BPs at home were running 140s/90s. She reports she a \" very bad headache and felt nauseated.\" She tried tylenol with no relief in her headache so she then tried Excedrin with no relief. She denies vision changes or swelling. She was concerned with her headache and elevated BPs so she called Dr. Moraes and it was advised that she come in for evaluation. She denies contraction, VB, or SROM. She reports good fetal movement.     PMH:   Past Medical History:   Diagnosis Date   • Asthma    • COPD (chronic obstructive pulmonary disease) (CMS/LTAC, [facility])    • Histoplasmosis    • Hypothyroid    • Reactive airway disease          PSH:   Past Surgical History:   Procedure Laterality Date   •  SECTION     • WISDOM TOOTH EXTRACTION         SoHx:   Social History     Social History   • Marital status: Significant Other     Spouse name: N/A   • Number of children: N/A   • Years of education: N/A     Occupational History   • Not on file.     Social History Main Topics   • Smoking status: Current Every Day Smoker     Packs/day: 1.00     Years: 16.00     Types: Cigarettes   • Smokeless tobacco: Never Used   • Alcohol use No   • Drug use: Yes     Types: Marijuana   • Sexual activity: Yes     Partners: Male     Other Topics Concern   • Not on file     Social History Narrative   • No narrative on file           FHx:   Family History   Problem Relation Age of Onset   • Diabetes Father    • Heart disease Father    • COPD Father  "   • Diabetes Mother    • Breast cancer Mother    • No Known Problems Son    • No Known Problems Daughter    • Lung cancer Maternal Grandmother    • Lung cancer Maternal Grandfather    • No Known Problems Son        Debilities/Disabilities Identified: None    Emotional Behavior: Appears normal.     PGyn Hx: Pap . GC/CT neg.     POBHx: . Previous  x 3. H/O PTD @ 36 weeks. H/O preeclampsia x 3.     Allergies: Eggs or egg-derived products and Ibuprofen    Medications:   Current Facility-Administered Medications:   •  acetaminophen (TYLENOL) tablet 1,000 mg, 1,000 mg, Oral, Q6H PRN, Ange Moraes DO, 1,000 mg at 10/11/18 0022  •  labetalol (NORMODYNE) tablet 100 mg, 100 mg, Oral, Q12H, Ange Moraes DO, 100 mg at 10/10/18 1958  •  pneumococcal polysaccharide 23-valent (PNEUMOVAX-23) vaccine 0.5 mL, 0.5 mL, Intramuscular, During Hospitalization, [place], Leonel Wick MD        Vital Signs  Temp:  [97.6 °F (36.4 °C)-97.9 °F (36.6 °C)] 97.6 °F (36.4 °C)  Heart Rate:  [80-95] 80  Resp:  [18-20] 20  BP: (125-158)/(82-98) 125/83    Physical Exam:  General: Alert and oriented x 3. Pleasant.   Heart: RRR  Lungs: CTAB  Abdomen: Soft, non tender, gravid uterus  Genitalia: def  Extremities: No edema. Neg Xin's sign. Reflexes 2+. No clonus.     Labs:   Lab Results (last 24 hours)     Procedure Component Value Units Date/Time    Comprehensive Metabolic Panel [[id number]]  (Abnormal) Collected:  10/10/18 2001    Specimen:  Blood Updated:  10/10/18 2027     Glucose 88 mg/dL      BUN 4 (L) mg/dL      Creatinine 0.45 (L) mg/dL      Sodium 135 (L) mmol/L      Potassium 3.8 mmol/L      Chloride 102 mmol/L      CO2 20.8 (L) mmol/L      Calcium 8.7 mg/dL      Total Protein 6.6 g/dL      Albumin 3.10 (L) g/dL      ALT (SGPT) 6 U/L      AST (SGOT) 10 U/L      Alkaline Phosphatase 98 U/L      Total Bilirubin <0.2 (L) mg/dL      eGFR Non African Amer >150 mL/min/1.73      Globulin 3.5 gm/dL      A/G Ratio 0.9  g/dL      BUN/Creatinine Ratio 8.9     Anion Gap 12.2 mmol/L     Uric Acid [[id number]]  (Normal) Collected:  10/10/18 2001    Specimen:  Blood Updated:  10/10/18 2026     Uric Acid 3.6 mg/dL     Urine Drug Screen - Urine, Clean Catch [[id number]]  (Abnormal) Collected:  10/10/18 1927    Specimen:  Urine from Urine, Clean Catch Updated:  10/10/18 2023     THC, Screen, Urine Positive (A)     Phencyclidine (PCP), Urine Negative     Cocaine Screen, Urine Negative     Methamphetamine, Urine Negative     Opiate Screen Negative     Amphetamine Screen, Urine Negative     Benzodiazepine Screen, Urine Negative     Tricyclic Antidepressants Screen Negative     Methadone Screen, Urine Negative     Barbiturates Screen, Urine Negative     Oxycodone Screen, Urine Negative     Propoxyphene Screen Negative     Buprenorphine, Screen, Urine Negative    Narrative:       Urine drug screen results are to be used for medical purposes only.  They are not to be used for legal purposes such as employment testing.  Negative results do not necessarily mean the complete absence of a subtance, but rather that the result is less than the cutoff for that substance.  Positive results are unconfirmed and considered Preliminary Positive.  [facility] does not automatically confirm Postitive Unconfirmed results.  The physician may request (order) an Unconfirmed Positive result to be sent out for confirmation.      Negative Thresholds for Drugs Screened:    THC screen, urine                          50 ng/ml  Phenycyclidine (PCP), urine                25 ng/ml  Cocaine screen, urine                     150 ng/ml  Methamphetamine, urine                    500 ng/ml  Opiate screen, urine                      100 ng/ml  Amphetamine screen, urine                 500 ng/ml  Benzodiazepine screen, urine              150 ng/ml  Tricyclic Antidepressants screen, urine   300 ng/ml  Methadone screen, urine                   200 ng/ml  Barbiturates  screen, urine                200 ng/ml  Oxycodone screen, urine                   100 ng/ml  Propoxyphene screen, urine                300 ng/ml  Buprenorphine screen, urine                10 ng/ml    CBC & Differential [[id number]] Collected:  10/10/18 2001    Specimen:  Blood Updated:  10/10/18 2010    Narrative:       The following orders were created for panel order CBC & Differential.  Procedure                               Abnormality         Status                     ---------                               -----------         ------                     CBC Auto Differential[[id number]]        Abnormal            Final result                 Please view results for these tests on the individual orders.    CBC Auto Differential [[id number]]  (Abnormal) Collected:  10/10/18 2001    Specimen:  Blood Updated:  10/10/18 2010     WBC 13.07 (H) 10*3/mm3      RBC 4.27 10*6/mm3      Hemoglobin 13.4 g/dL      Hematocrit 39.0 %      MCV 91.3 fL      MCH 31.4 (H) pg      MCHC 34.4 g/dL      RDW 12.9 %      RDW-SD 41.9 fl      MPV 10.5 (H) fL      Platelets 229 10*3/mm3      Neutrophil % 71.5 (H) %      Lymphocyte % 20.6 %      Monocyte % 6.4 %      Eosinophil % 0.8 %      Basophil % 0.4 %      Immature Grans % 0.3 %      Neutrophils, Absolute 9.35 (H) 10*3/mm3      Lymphocytes, Absolute 2.69 10*3/mm3      Monocytes, Absolute 0.83 10*3/mm3      Eosinophils, Absolute 0.11 10*3/mm3      Basophils, Absolute 0.05 10*3/mm3      Immature Grans, Absolute 0.04 (H) 10*3/mm3      nRBC 0.0 /100 WBC     POC Protein, Urine, Qualitative, Dipstick [[id number]]  (Abnormal) Collected:  10/10/18 1928    Specimen:  Urine Updated:  10/10/18 1929     Protein, POC Trace (A) mg/dL      Comment: neg glucose, neg ketones        Lot Number [id number]     Expiration Date 06/30/2019            Patient Active Problem List   Diagnosis   • Mild tetrahydrocannabinol (THC) abuse, counseled AKR 8/22/18   • Nausea and vomiting during pregnancy prior to 22 weeks  gestation   • Hypothyroidism- on Levothyroixine 100 mcg, check TSH each trimester   • Smoker   • Previous  section x 3- plan RLTCS at 39 weeks   • History of  delivery- went into labor at 36 weeks in last pregnancy   • Hx of preeclampsia, prior pregnancy, currently pregnant, third trimester   • Elevated blood pressure affecting pregnancy in third trimester, antepartum     1) Elevated BP in 3rd trimester with h/o preeclampsia- 24 hour urine in progress, will be completed around 6pm. Platelet count 229, AST 10, and ALT 6. BPs well controlled on Labetalol 100mg BID. Pt reports she feels much better today. Headache has improved, nausea has resolved. No swelling. Pt aware that plan of care to be determined based off of urine results and if BPs are well controlled. Fetal NST as ordered.     2) Previous  x 3- Plans repeat @ 39 weeks. Timing of delivery may vary pending 24 hour urine results. Pt aware.     3) Hypothyroidism- Continue synthroid 100mcg during hospital stay.    4) Smoker- I advised Sarita of the risks of continuing to use tobacco, and I provided her with tobacco cessation educational materials in the After Visit Summary.     During this visit, I spent 3-5 minutes counseling the patient regarding tobacco cessation.       I discussed the patients findings and my recommendations with patient and family .     Wendie Dunlap, LISBETH  10/11/18  8:59 AM

## 2018-10-11 NOTE — PLAN OF CARE
Problem: Patient Care Overview  Goal: Plan of Care Review   10/11/18 1804   Coping/Psychosocial   Plan of Care Reviewed With patient;spouse   Plan of Care Review   Progress improving   OTHER   Outcome Summary Monitor blood pressure, patient resting and collecting 24 hour urine 1900.      Goal: Individualization and Mutuality   10/11/18 1804   Individualization   Patient Specific Goals (Include Timeframe) 24 hour urine until 1900   Patient Specific Interventions Continue 24 hour urine until 1900.    Mutuality/Individual Preferences   What Anxieties, Fears, Concerns, or Questions Do You Have About Your Care? Patient concerned about possible preemplapcia due to previous pregnacies   Mutuality/Individual Preferences   How to Address Anxieties/Fears Check BP.

## 2018-10-11 NOTE — DISCHARGE INSTRUCTIONS
Call office tomorrow morning to schedule appointment as soon as possible, drink plenty of fluids, call office number at [phone] press option 8 for after hours to speak with the dr on call if you have any further questions or concerns

## 2018-10-11 NOTE — PLAN OF CARE
Problem: Patient Care Overview  Goal: Discharge Needs Assessment  Outcome: Ongoing (interventions implemented as appropriate)   10/11/18 1429   Discharge Needs Assessment   Readmission Within the Last 30 Days no previous admission in last 30 days   Concerns to be Addressed no discharge needs identified   Patient/Family Anticipates Transition to home with family   Transportation Anticipated family or friend will provide

## 2018-10-12 NOTE — NURSING NOTE
Case Management Discharge Note    Final Note: discharged home to self care.    Destination     No service has been selected for the patient.      Durable Medical Equipment     No service has been selected for the patient.      Dialysis/Infusion     No service has been selected for the patient.      Home Medical Care     No service has been selected for the patient.      Social Care     No service has been selected for the patient.             Final Discharge Disposition Code: 01 - home or self-care

## 2018-10-16 ENCOUNTER — ROUTINE PRENATAL (OUTPATIENT)
Dept: OBSTETRICS AND GYNECOLOGY | Facility: CLINIC | Age: 33
End: 2018-10-16

## 2018-10-16 VITALS — DIASTOLIC BLOOD PRESSURE: 70 MMHG | SYSTOLIC BLOOD PRESSURE: 100 MMHG | BODY MASS INDEX: 42.18 KG/M2 | WEIGHT: 293 LBS

## 2018-10-16 DIAGNOSIS — Z36.9 ENCOUNTER FOR ANTENATAL SCREENING, UNSPECIFIED: Primary | ICD-10-CM

## 2018-10-16 PROCEDURE — 99213 OFFICE O/P EST LOW 20 MIN: CPT | Performed by: OBSTETRICS & GYNECOLOGY

## 2018-10-16 NOTE — PROGRESS NOTES
OB follow up     Sarita Duncan is a 33 y.o.  36w2d being seen today for her obstetrical visit.  Patient reports no bleeding, no contractions and no leaking. Fetal movement: normal.  Patient was seen recently in the hospital for elevated blood pressures.  She reports her blood pressures have been labile.  Her last 24-hour urine was less than 300 mg of total protein.  She is on oral labetalol.  She has no complaints of a headache or visual changes.    Review of Systems  No bleeding, No cramping/contractions     /70   Wt 133 kg (294 lb)   LMP 2018   BMI 42.18 kg/m²     FHT: present BPM   Uterine Size:     GBS collected    Assessment/Plan:    1) 33 y.o.  -pregnancy at 36w2d    2)   Encounter Diagnosis   Name Primary?   • Encounter for  screening, unspecified Yes       3) Reviewed this stage of pregnancy  4) Problem list updated     Return in about 7 days (around 10/23/2018) for OB INT.      Ashu Smith MD    10/16/2018  4:44 PM

## 2018-10-18 LAB — GP B STREP DNA SPEC QL NAA+PROBE: POSITIVE

## 2018-10-23 ENCOUNTER — ROUTINE PRENATAL (OUTPATIENT)
Dept: OBSTETRICS AND GYNECOLOGY | Facility: CLINIC | Age: 33
End: 2018-10-23

## 2018-10-23 ENCOUNTER — PREP FOR SURGERY (OUTPATIENT)
Dept: OTHER | Facility: HOSPITAL | Age: 33
End: 2018-10-23

## 2018-10-23 VITALS — SYSTOLIC BLOOD PRESSURE: 180 MMHG | WEIGHT: 292 LBS | DIASTOLIC BLOOD PRESSURE: 90 MMHG | BODY MASS INDEX: 41.9 KG/M2

## 2018-10-23 DIAGNOSIS — O09.293 HX OF PREECLAMPSIA, PRIOR PREGNANCY, CURRENTLY PREGNANT, THIRD TRIMESTER: Primary | ICD-10-CM

## 2018-10-23 DIAGNOSIS — Z98.891 PREVIOUS CESAREAN SECTION: ICD-10-CM

## 2018-10-23 DIAGNOSIS — Z98.891 PREVIOUS CESAREAN SECTION: Primary | ICD-10-CM

## 2018-10-23 PROBLEM — Z22.330 GBS CARRIER: Status: ACTIVE | Noted: 2018-10-23

## 2018-10-23 LAB
ALBUMIN SERPL-MCNC: 3.5 G/DL (ref 3.5–5.2)
ALBUMIN/GLOB SERPL: 1.2 G/DL
ALP SERPL-CCNC: 121 U/L (ref 40–129)
ALT SERPL-CCNC: 7 U/L (ref 5–33)
AST SERPL-CCNC: 10 U/L (ref 5–32)
BASOPHILS # BLD AUTO: 0.04 10*3/MM3 (ref 0–0.2)
BASOPHILS NFR BLD AUTO: 0.3 % (ref 0–2)
BILIRUB SERPL-MCNC: 0.3 MG/DL (ref 0.2–1.2)
BUN SERPL-MCNC: 5 MG/DL (ref 6–20)
BUN/CREAT SERPL: 11.4 (ref 7–25)
CALCIUM SERPL-MCNC: 8.8 MG/DL (ref 8.6–10.5)
CHLORIDE SERPL-SCNC: 100 MMOL/L (ref 98–107)
CO2 SERPL-SCNC: 20.6 MMOL/L (ref 22–29)
CREAT SERPL-MCNC: 0.44 MG/DL (ref 0.57–1)
EOSINOPHIL # BLD AUTO: 0.09 10*3/MM3 (ref 0.1–0.3)
EOSINOPHIL NFR BLD AUTO: 0.7 % (ref 0–4)
ERYTHROCYTE [DISTWIDTH] IN BLOOD BY AUTOMATED COUNT: 12.9 % (ref 11.5–14.5)
GLOBULIN SER CALC-MCNC: 3 GM/DL
GLUCOSE SERPL-MCNC: 81 MG/DL (ref 65–99)
HCT VFR BLD AUTO: 40.6 % (ref 37–47)
HGB BLD-MCNC: 14 G/DL (ref 12–16)
IMM GRANULOCYTES # BLD: 0.05 10*3/MM3 (ref 0–0.03)
IMM GRANULOCYTES NFR BLD: 0.4 % (ref 0–0.5)
LYMPHOCYTES # BLD AUTO: 2.19 10*3/MM3 (ref 0.6–4.8)
LYMPHOCYTES NFR BLD AUTO: 17.9 % (ref 20–45)
MCH RBC QN AUTO: 31.5 PG (ref 27–31)
MCHC RBC AUTO-ENTMCNC: 34.5 G/DL (ref 31–37)
MCV RBC AUTO: 91.2 FL (ref 81–99)
MONOCYTES # BLD AUTO: 0.81 10*3/MM3 (ref 0–1)
MONOCYTES NFR BLD AUTO: 6.6 % (ref 3–8)
NEUTROPHILS # BLD AUTO: 9.06 10*3/MM3 (ref 1.5–8.3)
NEUTROPHILS NFR BLD AUTO: 74.1 % (ref 45–70)
NRBC BLD AUTO-RTO: 0 /100 WBC (ref 0–0)
PLATELET # BLD AUTO: 240 10*3/MM3 (ref 140–500)
POTASSIUM SERPL-SCNC: 4.3 MMOL/L (ref 3.5–5.2)
PROT SERPL-MCNC: 6.5 G/DL (ref 6–8.5)
RBC # BLD AUTO: 4.45 10*6/MM3 (ref 4.2–5.4)
SODIUM SERPL-SCNC: 135 MMOL/L (ref 136–145)
WBC # BLD AUTO: 12.24 10*3/MM3 (ref 4.8–10.8)

## 2018-10-23 PROCEDURE — 99213 OFFICE O/P EST LOW 20 MIN: CPT | Performed by: OBSTETRICS & GYNECOLOGY

## 2018-10-23 RX ORDER — SODIUM CHLORIDE, SODIUM LACTATE, POTASSIUM CHLORIDE, CALCIUM CHLORIDE 600; 310; 30; 20 MG/100ML; MG/100ML; MG/100ML; MG/100ML
125 INJECTION, SOLUTION INTRAVENOUS CONTINUOUS
Status: CANCELLED | OUTPATIENT
Start: 2018-10-23

## 2018-10-23 RX ORDER — METHYLERGONOVINE MALEATE 0.2 MG/ML
200 INJECTION INTRAVENOUS ONCE AS NEEDED
Status: CANCELLED | OUTPATIENT
Start: 2018-10-23

## 2018-10-23 RX ORDER — SODIUM CHLORIDE 0.9 % (FLUSH) 0.9 %
1-10 SYRINGE (ML) INJECTION AS NEEDED
Status: CANCELLED | OUTPATIENT
Start: 2018-10-23

## 2018-10-23 RX ORDER — LIDOCAINE HYDROCHLORIDE 10 MG/ML
5 INJECTION, SOLUTION EPIDURAL; INFILTRATION; INTRACAUDAL; PERINEURAL AS NEEDED
Status: CANCELLED | OUTPATIENT
Start: 2018-10-23

## 2018-10-23 RX ORDER — CARBOPROST TROMETHAMINE 250 UG/ML
250 INJECTION, SOLUTION INTRAMUSCULAR AS NEEDED
Status: CANCELLED | OUTPATIENT
Start: 2018-10-23

## 2018-10-23 RX ORDER — MISOPROSTOL 200 UG/1
800 TABLET ORAL AS NEEDED
Status: CANCELLED | OUTPATIENT
Start: 2018-10-23

## 2018-10-23 RX ORDER — SODIUM CHLORIDE 0.9 % (FLUSH) 0.9 %
3 SYRINGE (ML) INJECTION EVERY 12 HOURS SCHEDULED
Status: CANCELLED | OUTPATIENT
Start: 2018-10-23

## 2018-10-23 NOTE — PROGRESS NOTES
OB follow up     Sarita Duncan is a 33 y.o.  37w2d being seen today for her obstetrical visit.  Patient reports backache, no bleeding, no contractions and no leaking. Fetal movement: normal.  Patient reports no lower extremity swelling.  She reports mild headache.  She reports no visual changes.    Review of Systems  No bleeding, No cramping/contractions     /90   Wt 132 kg (292 lb)   LMP 2018   BMI 41.90 kg/m²    Repeat blood pressure is 150/92    FHT: present BPM   Uterine Size:     GBS collected last visit was positive and this was relayed to the patient.  Lower extremities have no edema and there is no clonus.    Assessment/Plan:    1) 33 y.o.  -pregnancy at 37w2d    2)   Encounter Diagnoses   Name Primary?   • Hx of preeclampsia, prior pregnancy, currently pregnant, third trimester Yes   • Previous  section x 3- plan RLTCS at 39 weeks    Hypertension in pregnancy with a history of preeclampsia.  This is the first time she has had protein on urine dip.  She is currently on labetalol by mouth.  Recommend labs and 24-hour urine today.  If the diagnosis of preeclampsia is made  can be moved up.  Eclampsia warnings given.  Follow-up testing.    3) Reviewed this stage of pregnancy  4) Problem list updated     No Follow-up on file.      Ashu Smith MD    10/23/2018  10:36 AM

## 2018-10-24 ENCOUNTER — LAB (OUTPATIENT)
Dept: OBSTETRICS AND GYNECOLOGY | Facility: CLINIC | Age: 33
End: 2018-10-24

## 2018-10-24 DIAGNOSIS — O13.3 GESTATIONAL HYPERTENSION, THIRD TRIMESTER: Primary | ICD-10-CM

## 2018-10-25 ENCOUNTER — HOSPITAL ENCOUNTER (OUTPATIENT)
Facility: HOSPITAL | Age: 33
Setting detail: OBSERVATION
Discharge: HOME | End: 2018-10-25
Attending: OBSTETRICS & GYNECOLOGY | Admitting: OBSTETRICS & GYNECOLOGY

## 2018-10-25 ENCOUNTER — HOSPITAL ENCOUNTER (OUTPATIENT)
Facility: HOSPITAL | Age: 33
End: 2018-10-25
Attending: OBSTETRICS & GYNECOLOGY | Admitting: OBSTETRICS & GYNECOLOGY

## 2018-10-25 ENCOUNTER — HOSPITAL ENCOUNTER (OUTPATIENT)
Facility: HOSPITAL | Age: 33
Setting detail: SURGERY ADMIT
Discharge: LEFT BEFORE DISCHARGE | End: 2018-10-25
Attending: OBSTETRICS & GYNECOLOGY | Admitting: OBSTETRICS & GYNECOLOGY

## 2018-10-25 VITALS
DIASTOLIC BLOOD PRESSURE: 84 MMHG | TEMPERATURE: 97.3 F | BODY MASS INDEX: 41.8 KG/M2 | RESPIRATION RATE: 18 BRPM | SYSTOLIC BLOOD PRESSURE: 152 MMHG | WEIGHT: 292 LBS | HEIGHT: 70 IN | HEART RATE: 88 BPM

## 2018-10-25 VITALS
BODY MASS INDEX: 41.95 KG/M2 | TEMPERATURE: 97.5 F | HEART RATE: 82 BPM | WEIGHT: 293 LBS | DIASTOLIC BLOOD PRESSURE: 88 MMHG | RESPIRATION RATE: 16 BRPM | HEIGHT: 70 IN | SYSTOLIC BLOOD PRESSURE: 127 MMHG

## 2018-10-25 LAB
ALBUMIN SERPL-MCNC: 3.3 G/DL (ref 3.5–5.2)
ALBUMIN/GLOB SERPL: 0.9 G/DL
ALP SERPL-CCNC: 116 U/L (ref 40–129)
ALT SERPL W P-5'-P-CCNC: 6 U/L (ref 5–33)
AMPHET+METHAMPHET UR QL: NEGATIVE
AMPHETAMINES UR QL: NEGATIVE
ANION GAP SERPL CALCULATED.3IONS-SCNC: 13.7 MMOL/L
AST SERPL-CCNC: 13 U/L (ref 5–32)
BARBITURATES UR QL SCN: NEGATIVE
BENZODIAZ UR QL SCN: NEGATIVE
BILIRUB SERPL-MCNC: 0.2 MG/DL (ref 0.2–1.2)
BILIRUB UR QL STRIP: NEGATIVE
BUN BLD-MCNC: 5 MG/DL (ref 6–20)
BUN/CREAT SERPL: 11.6 (ref 7–25)
BUPRENORPHINE SERPL-MCNC: NEGATIVE NG/ML
CALCIUM SPEC-SCNC: 8.6 MG/DL (ref 8.6–10.5)
CANNABINOIDS SERPL QL: POSITIVE
CHLORIDE SERPL-SCNC: 100 MMOL/L (ref 98–107)
CLARITY UR: CLEAR
CO2 SERPL-SCNC: 20.3 MMOL/L (ref 22–29)
COCAINE UR QL: NEGATIVE
COLOR UR: ABNORMAL
CREAT BLD-MCNC: 0.43 MG/DL (ref 0.57–1)
DEPRECATED RDW RBC AUTO: 44.7 FL (ref 37–54)
ERYTHROCYTE [DISTWIDTH] IN BLOOD BY AUTOMATED COUNT: 13 % (ref 11.5–14.5)
GFR SERPL CREATININE-BSD FRML MDRD: >150 ML/MIN/1.73
GLOBULIN UR ELPH-MCNC: 3.7 GM/DL
GLUCOSE BLD-MCNC: 84 MG/DL (ref 65–99)
GLUCOSE UR STRIP-MCNC: NEGATIVE MG/DL
HCT VFR BLD AUTO: 40.7 % (ref 37–47)
HGB BLD-MCNC: 13.7 G/DL (ref 12–16)
HGB UR QL STRIP.AUTO: NEGATIVE
KETONES UR QL STRIP: NEGATIVE
LEUKOCYTE ESTERASE UR QL STRIP.AUTO: NEGATIVE
MCH RBC QN AUTO: 31.2 PG (ref 27–31)
MCHC RBC AUTO-ENTMCNC: 33.7 G/DL (ref 31–37)
MCV RBC AUTO: 92.7 FL (ref 81–99)
METHADONE UR QL SCN: NEGATIVE
NITRITE UR QL STRIP: NEGATIVE
OPIATES UR QL: NEGATIVE
OXYCODONE UR QL SCN: NEGATIVE
PCP UR QL SCN: NEGATIVE
PH UR STRIP.AUTO: 6.5 [PH] (ref 4.5–8)
PLATELET # BLD AUTO: 218 10*3/MM3 (ref 140–500)
PMV BLD AUTO: 10.4 FL (ref 7.4–10.4)
POTASSIUM BLD-SCNC: 4 MMOL/L (ref 3.5–5.2)
PROPOXYPH UR QL: NEGATIVE
PROT SERPL-MCNC: 7 G/DL (ref 6–8.5)
PROT UR QL STRIP: NEGATIVE
RBC # BLD AUTO: 4.39 10*6/MM3 (ref 4.2–5.4)
SODIUM BLD-SCNC: 134 MMOL/L (ref 136–145)
SP GR UR STRIP: 1.02 (ref 1–1.03)
TRICYCLICS UR QL SCN: NEGATIVE
UROBILINOGEN UR QL STRIP: ABNORMAL
WBC NRBC COR # BLD: 12.29 10*3/MM3 (ref 4.8–10.8)

## 2018-10-25 PROCEDURE — 59025 FETAL NON-STRESS TEST: CPT

## 2018-10-25 PROCEDURE — 85027 COMPLETE CBC AUTOMATED: CPT | Performed by: OBSTETRICS & GYNECOLOGY

## 2018-10-25 PROCEDURE — G0378 HOSPITAL OBSERVATION PER HR: HCPCS

## 2018-10-25 PROCEDURE — G0463 HOSPITAL OUTPT CLINIC VISIT: HCPCS

## 2018-10-25 PROCEDURE — 80306 DRUG TEST PRSMV INSTRMNT: CPT | Performed by: OBSTETRICS & GYNECOLOGY

## 2018-10-25 PROCEDURE — 80053 COMPREHEN METABOLIC PANEL: CPT | Performed by: OBSTETRICS & GYNECOLOGY

## 2018-10-25 PROCEDURE — 59025 FETAL NON-STRESS TEST: CPT | Performed by: OBSTETRICS & GYNECOLOGY

## 2018-10-25 PROCEDURE — 81003 URINALYSIS AUTO W/O SCOPE: CPT | Performed by: OBSTETRICS & GYNECOLOGY

## 2018-10-26 ENCOUNTER — HOSPITAL ENCOUNTER (OUTPATIENT)
Facility: HOSPITAL | Age: 33
End: 2018-10-26
Attending: OBSTETRICS & GYNECOLOGY | Admitting: OBSTETRICS & GYNECOLOGY

## 2018-10-26 ENCOUNTER — ROUTINE PRENATAL (OUTPATIENT)
Dept: OBSTETRICS AND GYNECOLOGY | Facility: CLINIC | Age: 33
End: 2018-10-26

## 2018-10-26 VITALS — BODY MASS INDEX: 42.47 KG/M2 | SYSTOLIC BLOOD PRESSURE: 142 MMHG | WEIGHT: 293 LBS | DIASTOLIC BLOOD PRESSURE: 92 MMHG

## 2018-10-26 DIAGNOSIS — Z22.330 GBS CARRIER: ICD-10-CM

## 2018-10-26 DIAGNOSIS — O09.293 HX OF PREECLAMPSIA, PRIOR PREGNANCY, CURRENTLY PREGNANT, THIRD TRIMESTER: ICD-10-CM

## 2018-10-26 DIAGNOSIS — O16.3 ELEVATED BLOOD PRESSURE AFFECTING PREGNANCY IN THIRD TRIMESTER, ANTEPARTUM: ICD-10-CM

## 2018-10-26 DIAGNOSIS — Z98.891 PREVIOUS CESAREAN SECTION: ICD-10-CM

## 2018-10-26 DIAGNOSIS — Z34.93 PRENATAL CARE IN THIRD TRIMESTER: Primary | ICD-10-CM

## 2018-10-26 DIAGNOSIS — F12.10 MILD TETRAHYDROCANNABINOL (THC) ABUSE: ICD-10-CM

## 2018-10-26 LAB
PROT 24H UR-MRATE: 241.5 MG/24HOURS (ref 28–141)
PROT UR-MCNC: 21 MG/DL

## 2018-10-26 PROCEDURE — 99214 OFFICE O/P EST MOD 30 MIN: CPT | Performed by: NURSE PRACTITIONER

## 2018-10-26 NOTE — PROGRESS NOTES
"OB follow up > 20 weeks    Chief Complaint   Patient presents with   • Routine Prenatal Visit       Sarita Duncan is a 33 y.o.  37w5d being seen today for her obstetrical visit.  Patient reports not feeling well today. She is tearful and states, \" I don't know why I havent been delivered yet because my blood pressures are high.\" She reports having preeclampsia with previous pregnancies and she was delivered by now in the past. She just completed a 24 hour urine 2 days ago which was normal.  She is taking labetalol 100mg BID. She reports her BP are labile. Sometimes 160s/100s and other times 70/40s at home. She reports she was seen in the [facility] [place] yesterday and was discharged after being monitored. She was not happy with the care she got because she states the physician, \"didn't even come in to see me.\" After leaving [place], she decided to go to [facility] for a second opinion; however, the on call physician for [facility] was called so no second opinion was obtained. She is tearful discussing. She states, \" I guess I will just stroke out at home.\" She states she wants a healthy baby but she is also worried that she will not survive to be a parent to her 3  Children at home. She also states, \" I am so disappointed because I have delivered all of my children with [facility] and I just dont trust these doctors anymore.\" She also reports she might be leaking fluid today but she is uncertain.      Weight 296 lb  LMP 2018   BMI 42.47 kg/m²     FHT: 140s  BPM   Uterine Size: size equals dates       Assessment    1) pregnancy at 37w5d   2) Gestational HTN- Continue labetalol. Disc diagnosis criteria for preeclampsia and delivery. Rev lab findings with patient. Rev warn s/s of preeclampsia. Disc that medical practice have improved and recommendations for treatment have changed over the last few years since her last delivery. Called and disc case with Dr. Smith, recommends for " [facility] Schedule NST/BPP for Monday.   3) Leaking of fluid- Rec Amnisure eval at [facility].   4) Previous - Has repeat planned on 18  5) GBS +   6) + THC 10/18  Plan    Continue prenatal vitamins  Reviewed this stage of pregnancy  Problem list updated   Follow up on Monday for NST/BPP and OB tummy     Counseling was given to patient for the following topics: diagnostic results, instructions for management, risk factor reductions, prognosis, patient and family education, impressions, risks and benefits of treatment options and importance of treatment compliance . Total face to face time of the encounter was 30 minutes and 25 minutes was spend counseling.      LISBETH Miller  10/26/2018  12:55 PM

## 2018-10-26 NOTE — NON STRESS TEST
Sarita Duncan, a  at 37w4d with an HAYES of 2018, by Ultrasound, was seen at [facility] for a nonstress test.    No chief complaint on file.      Patient Active Problem List   Diagnosis   • Mild tetrahydrocannabinol (THC) abuse, counseled AKR 18   • Hypothyroidism- on Levothyroixine 100 mcg, check TSH each trimester   • Smoker   • Previous  section x 3- plan RLTCS at 39 weeks   • History of  delivery- went into labor at 36 weeks in last pregnancy   • Hx of preeclampsia, prior pregnancy, currently pregnant, third trimester   • Elevated blood pressure affecting pregnancy in third trimester, antepartum   • Pregnancy   • GBS carrier       Start Time: 2300  Stop Time: 2336    Interpretation A  Nonstress Test Interpretation A: Reactive (10/25/18 6696 : Brook Ramires, RN)

## 2018-10-26 NOTE — NURSING NOTE
Dr. Moraes notif of pt arrival, BP and c/o of migraine and wanting to be delivered and not understanding why they won't do a C/S on her as Dr. Seth said he would.  Dr. Moraes said that pt refused to stay at [place] earlier today for further monitoring and treatment of BP and had signed out AMA.  Dr. Moraes notif of pt BP since arrival. MD said that labs were drawn earlier in the day and the pt had been notified that they were WNL and told to return to MD office on Friday for BP check and assessment.  Dr. Moraes said to tell the patient that was still the plan of care. Orders to obtain NST and instruct pt to go home and see MD in the morning in the office.

## 2018-10-26 NOTE — NURSING NOTE
"Pt here with c/o elevated BP and migraine. States was seem at [place] earlier today and they sent her home \"without doing anything for me so I came here for another opinion\".  Explained to Ms Duncan that her doctors had privileges her and that they were the ones that would be notified of her arrival. Pt said \"I may as well just go home then\".  Encouraged pt to stay for monitoring of BP and NST while I spoke with her MD.  Pt agreeable.    "

## 2018-10-26 NOTE — NURSING NOTE
"Instructed the pt at length on the importance of continuing with BP meds, bedrest and to see MD tomorrow for BP check. Pt states that there is no reason to because \"they won't deliver me anyway which is the only thing I want\"  Encouraged to keep appointment and to discuss concerns with MD.  Pt states that she plans to find another doctor and that \"I should have done that weeks ago\".  Reassured the pt that labs were WNL and FHT reactive.  Pt says \"yea that is what they tell me\". Pt wanting off the monitor and to be discharged home. Reactive NST obtained  "

## 2018-10-29 ENCOUNTER — PROCEDURE VISIT (OUTPATIENT)
Dept: OBSTETRICS AND GYNECOLOGY | Facility: CLINIC | Age: 33
End: 2018-10-29

## 2018-10-29 ENCOUNTER — ROUTINE PRENATAL (OUTPATIENT)
Dept: OBSTETRICS AND GYNECOLOGY | Facility: CLINIC | Age: 33
End: 2018-10-29

## 2018-10-29 VITALS — DIASTOLIC BLOOD PRESSURE: 84 MMHG | WEIGHT: 293 LBS | SYSTOLIC BLOOD PRESSURE: 122 MMHG | BODY MASS INDEX: 42.33 KG/M2

## 2018-10-29 DIAGNOSIS — O13.9 GESTATIONAL HYPERTENSION, ANTEPARTUM: ICD-10-CM

## 2018-10-29 DIAGNOSIS — O09.293 HX OF PREECLAMPSIA, PRIOR PREGNANCY, CURRENTLY PREGNANT, THIRD TRIMESTER: ICD-10-CM

## 2018-10-29 DIAGNOSIS — F17.200 SMOKER: ICD-10-CM

## 2018-10-29 DIAGNOSIS — O16.3 ELEVATED BLOOD PRESSURE AFFECTING PREGNANCY IN THIRD TRIMESTER, ANTEPARTUM: Primary | ICD-10-CM

## 2018-10-29 DIAGNOSIS — Z22.330 GBS CARRIER: ICD-10-CM

## 2018-10-29 DIAGNOSIS — E03.8 OTHER SPECIFIED HYPOTHYROIDISM: ICD-10-CM

## 2018-10-29 DIAGNOSIS — R63.8 INCREASED BMI (BODY MASS INDEX): Primary | ICD-10-CM

## 2018-10-29 DIAGNOSIS — J45.40 MODERATE PERSISTENT ASTHMA WITHOUT COMPLICATION: ICD-10-CM

## 2018-10-29 DIAGNOSIS — Z87.51 HISTORY OF PRETERM DELIVERY: ICD-10-CM

## 2018-10-29 DIAGNOSIS — Z98.891 PREVIOUS CESAREAN SECTION: ICD-10-CM

## 2018-10-29 DIAGNOSIS — F12.10 MILD TETRAHYDROCANNABINOL (THC) ABUSE: ICD-10-CM

## 2018-10-29 PROCEDURE — 99214 OFFICE O/P EST MOD 30 MIN: CPT | Performed by: OBSTETRICS & GYNECOLOGY

## 2018-10-29 PROCEDURE — 99406 BEHAV CHNG SMOKING 3-10 MIN: CPT | Performed by: OBSTETRICS & GYNECOLOGY

## 2018-10-29 PROCEDURE — 76816 OB US FOLLOW-UP PER FETUS: CPT | Performed by: OBSTETRICS & GYNECOLOGY

## 2018-10-29 PROCEDURE — 76818 FETAL BIOPHYS PROFILE W/NST: CPT | Performed by: OBSTETRICS & GYNECOLOGY

## 2018-10-29 NOTE — PROGRESS NOTES
S:    Pt here for ob office visit & ANT.      history:  HPI:Sarita Duncan is a 33 y.o.  38w1d being seen today for her obstetrical visit.   Patient reports no complaints . Fetal movement: normal. .        ROS: Pt denies visual changes, headaches, shortness of breath, chest pain, esophageal reflux, gastric pain, nausea and vomiting, diarrhea, rashes, vaginal bleeding, edema, hip pain, pelvic pressure.     PFSH:   Past Medical History:   Diagnosis Date   • Asthma    • COPD (chronic obstructive pulmonary disease) (CMS/HCC)    • Histoplasmosis    • Hypertension    • Hypothyroid    • Migraine    • Reactive airway disease        SMOKER? Yes  Ready to quit: Not Answered  Counseling given: Not Answered  .  I advised the patient of the risks in continuing to use tobacco, and I provided this patient with smoking cessation educational materials.  I also discussed how to quit smoking and the patient has expressed the willingness to quit.    During this visit, I spent > 10 minutes counseling the patient regarding smoking cessation.   PT COUNSELED TO QUIT SMOKING IN PREGNANCY.  (Cigarette smoking is associated with increased incidence of IUGR, PROM, PTL, PTD, SIDS, asthma, and ear infections.  . Smoking cessation is the single most important thing she can do to improve the pregnancy outcome.)         ALCOHOL? no  ILLICIT DRUGS? no      O:  /84   Wt 134 kg (295 lb)   LMP 2018   BMI 42.33 kg/m² , additional findings in addition to above flow sheet: bpp = 10/10      A:  MEDICAL DECISION MAKING:   DIAGNOSES:  33 y.o.  38w1d  Patient Active Problem List   Diagnosis   • Mild tetrahydrocannabinol (THC) abuse, counseled AKR 18   • Hypothyroidism- on Levothyroixine 100 mcg, check TSH each trimester   • Smoker   • Previous  section x 3- plan RLTCS at 39 weeks   • History of  delivery- went into labor at 36 weeks in last pregnancy   • Hx of preeclampsia, prior pregnancy, currently pregnant,  third trimester   • Elevated blood pressure affecting pregnancy in third trimester, antepartum   • Pregnancy   • GBS carrier   • Moderate persistent asthma without complication     NEW PROBLEMS? Not really.     Data Review: UA & BPP  ANT? Yes: 10/10, u/s wnl; reviewed with pt.   Lab Results (last 24 hours)     ** No results found for the last 24 hours. **          Sarita was seen today for routine prenatal visit.    Diagnoses and all orders for this visit:    Elevated blood pressure affecting pregnancy in third trimester, antepartum    Other specified hypothyroidism    Mild tetrahydrocannabinol (THC) abuse, counseled AKR 18    Smoker    Previous  section x 3- plan RLTCS at 39 weeks    History of  delivery- went into labor at 36 weeks in last pregnancy    Hx of preeclampsia, prior pregnancy, currently pregnant, third trimester    GBS carrier    Moderate persistent asthma without complication      Pregnancy Assessment : High Risk Pregnancy smoker, previous C/S      P:  Tests ordered for this or next visit:  TESTING FOR smoker, GHTN, hx preeclampsia  New Meds: no  Pt still on labetalol and her thyroid meds and her asthma meds.   Return in about 5 days (around 11/3/2018).    Time: More than 50% of time spent in counseling and/or coordination of care:  Total face-to-face/floor time 40 min.  Time spent in counseling 20 min. Counseling included the following topics with prognosis, differential diagnosis, risks, benefits of treatment, instructions, complicance and/or risk reduction and alternatives: labor warnings.      Leonel Seth MD

## 2018-10-31 ENCOUNTER — ANESTHESIA EVENT (OUTPATIENT)
Dept: OBSTETRICS AND GYNECOLOGY | Facility: HOSPITAL | Age: 33
End: 2018-10-31

## 2018-11-02 ENCOUNTER — HOSPITAL ENCOUNTER (OUTPATIENT)
Facility: HOSPITAL | Age: 33
Discharge: HOME | End: 2018-11-02
Attending: OBSTETRICS & GYNECOLOGY | Admitting: OBSTETRICS & GYNECOLOGY

## 2018-11-02 ENCOUNTER — PROCEDURE VISIT (OUTPATIENT)
Dept: OBSTETRICS AND GYNECOLOGY | Facility: CLINIC | Age: 33
End: 2018-11-02

## 2018-11-02 ENCOUNTER — ROUTINE PRENATAL (OUTPATIENT)
Dept: OBSTETRICS AND GYNECOLOGY | Facility: CLINIC | Age: 33
End: 2018-11-02

## 2018-11-02 VITALS
RESPIRATION RATE: 18 BRPM | WEIGHT: 293 LBS | SYSTOLIC BLOOD PRESSURE: 125 MMHG | HEIGHT: 70 IN | HEART RATE: 85 BPM | DIASTOLIC BLOOD PRESSURE: 86 MMHG | TEMPERATURE: 97.7 F | BODY MASS INDEX: 41.95 KG/M2

## 2018-11-02 VITALS — DIASTOLIC BLOOD PRESSURE: 98 MMHG | BODY MASS INDEX: 42.57 KG/M2 | WEIGHT: 293 LBS | SYSTOLIC BLOOD PRESSURE: 148 MMHG

## 2018-11-02 DIAGNOSIS — F12.10 MILD TETRAHYDROCANNABINOL (THC) ABUSE: ICD-10-CM

## 2018-11-02 DIAGNOSIS — O16.3 ELEVATED BLOOD PRESSURE AFFECTING PREGNANCY IN THIRD TRIMESTER, ANTEPARTUM: ICD-10-CM

## 2018-11-02 DIAGNOSIS — O09.293 HX OF PREECLAMPSIA, PRIOR PREGNANCY, CURRENTLY PREGNANT, THIRD TRIMESTER: ICD-10-CM

## 2018-11-02 DIAGNOSIS — Z34.93 PRENATAL CARE IN THIRD TRIMESTER: Primary | ICD-10-CM

## 2018-11-02 DIAGNOSIS — Z87.51 HISTORY OF PRETERM DELIVERY: ICD-10-CM

## 2018-11-02 DIAGNOSIS — O13.3 PREGNANCY-INDUCED HYPERTENSION IN THIRD TRIMESTER: Primary | ICD-10-CM

## 2018-11-02 DIAGNOSIS — Z22.330 GBS CARRIER: ICD-10-CM

## 2018-11-02 DIAGNOSIS — F17.200 SMOKER: ICD-10-CM

## 2018-11-02 LAB
AMPHET+METHAMPHET UR QL: NEGATIVE
AMPHETAMINES UR QL: NEGATIVE
BARBITURATES UR QL SCN: NEGATIVE
BENZODIAZ UR QL SCN: NEGATIVE
BUPRENORPHINE SERPL-MCNC: NEGATIVE NG/ML
CANNABINOIDS SERPL QL: NEGATIVE
COCAINE UR QL: NEGATIVE
EXPIRATION DATE: NORMAL
GLUCOSE UR STRIP-MCNC: NEGATIVE MG/DL
KETONES UR QL: NEGATIVE
Lab: NORMAL
METHADONE UR QL SCN: NEGATIVE
OPIATES UR QL: NEGATIVE
OXYCODONE UR QL SCN: NEGATIVE
PCP UR QL SCN: NEGATIVE
PROPOXYPH UR QL: NEGATIVE
PROT UR STRIP-MCNC: NEGATIVE MG/DL
TRICYCLICS UR QL SCN: NEGATIVE

## 2018-11-02 PROCEDURE — 81002 URINALYSIS NONAUTO W/O SCOPE: CPT | Performed by: OBSTETRICS & GYNECOLOGY

## 2018-11-02 PROCEDURE — 99214 OFFICE O/P EST MOD 30 MIN: CPT | Performed by: NURSE PRACTITIONER

## 2018-11-02 PROCEDURE — 59025 FETAL NON-STRESS TEST: CPT | Performed by: OBSTETRICS & GYNECOLOGY

## 2018-11-02 PROCEDURE — 80306 DRUG TEST PRSMV INSTRMNT: CPT | Performed by: OBSTETRICS & GYNECOLOGY

## 2018-11-02 PROCEDURE — 76818 FETAL BIOPHYS PROFILE W/NST: CPT | Performed by: NURSE PRACTITIONER

## 2018-11-02 PROCEDURE — 59025 FETAL NON-STRESS TEST: CPT

## 2018-11-02 PROCEDURE — G0463 HOSPITAL OUTPT CLINIC VISIT: HCPCS

## 2018-11-02 RX ORDER — LABETALOL 100 MG/1
TABLET, FILM COATED ORAL
Status: DISCONTINUED
Start: 2018-11-02 | End: 2018-11-02 | Stop reason: WASHOUT

## 2018-11-02 RX ORDER — LABETALOL 100 MG/1
100 TABLET, FILM COATED ORAL ONCE
Status: DISCONTINUED | OUTPATIENT
Start: 2018-11-02 | End: 2018-11-03 | Stop reason: HOSPADM

## 2018-11-02 NOTE — PROGRESS NOTES
OB follow up > 20 weeks    Chief Complaint   Patient presents with   • Routine Prenatal Visit       Sarita Duncan is a 33 y.o.  38w5d being seen today for her obstetrical visit.  Patient reports fatigue. Fetal movement: normal. +PNV.      Review of Systems  No bleeding. No cramping/contractions. No leaking of fluid. Good fetal movement.       /98   Wt 135 kg (296 lb 11 oz)   LMP 2018   BMI 42.57 kg/m²     FHT: 130s  BPM   Uterine Size: size equals dates   Trace edema     Assessment    1) pregnancy at 38w5d- US IMP: BPP 8/8. TERESA 10cm.  NST reactive.   2) Gestational hypertension- Taking Labetalol 100mg BID. She did not take her medication this morning. Instructed to go home and take labetalol. If her BP does not improve, [facility]. Rev gema s/s  3) Previous - Has repeat planned on 18  5) GBS +   6) + THC 10/25     Plan    Continue prenatal vitamins  Reviewed this stage of pregnancy  Problem list updated   Has scheduled repeat  on Monday.     Wendie Dunlap, LISBETH  2018  9:54 AM

## 2018-11-03 NOTE — SIGNIFICANT NOTE
"Pt states at home her bp falls to \"70/40\" and \"spikes high\", in reviewing previous visits lowest bp is 100/70, discussed w/pt. Pt states its different at home than here and refused labetalol that was ordered to be given early.   "

## 2018-11-03 NOTE — NON STRESS TEST
Sarita Duncan, a  at 38w5d with an HAYES of 2018, by Ultrasound, was seen at [facility] for a nonstress test.    Chief Complaint   Patient presents with   • Hypertension-Pregnant     contractions got stronger approx 1400        Patient Active Problem List   Diagnosis   • Mild tetrahydrocannabinol (THC) abuse, counseled AKR 18   • Hypothyroidism- on Levothyroixine 100 mcg, check TSH each trimester   • Smoker   • Previous  section x 3- plan RLTCS at 39 weeks   • History of  delivery- went into labor at 36 weeks in last pregnancy   • Hx of preeclampsia, prior pregnancy, currently pregnant, third trimester   • Elevated blood pressure affecting pregnancy in third trimester, antepartum   • Pregnancy   • GBS carrier   • Moderate persistent asthma without complication       Start Time:  Stop Time:     Interpretation A  Nonstress Test Interpretation A: Reactive (18 : Janette Hogan RN)

## 2018-11-03 NOTE — DISCHARGE INSTRUCTIONS
BEDREST!!! Return to the hospital for your scheduled c section, call the dr on call this weekend at [phone] option 8 if you have any new or further concerns or questions. Take your meds as ordered and REST REST REST.

## 2018-11-05 ENCOUNTER — HOSPITAL ENCOUNTER (INPATIENT)
Facility: HOSPITAL | Age: 33
LOS: 2 days | Discharge: HOME | End: 2018-11-07
Attending: OBSTETRICS & GYNECOLOGY | Admitting: OBSTETRICS & GYNECOLOGY

## 2018-11-05 ENCOUNTER — ANESTHESIA (OUTPATIENT)
Dept: OBSTETRICS AND GYNECOLOGY | Facility: HOSPITAL | Age: 33
End: 2018-11-05

## 2018-11-05 DIAGNOSIS — Z98.891 PREVIOUS CESAREAN SECTION: ICD-10-CM

## 2018-11-05 LAB
ABO GROUP BLD: NORMAL
AMPHET+METHAMPHET UR QL: NEGATIVE
AMPHETAMINES UR QL: NEGATIVE
BARBITURATES UR QL SCN: NEGATIVE
BENZODIAZ UR QL SCN: NEGATIVE
BLD GP AB SCN SERPL QL: NEGATIVE
BUPRENORPHINE SERPL-MCNC: NEGATIVE NG/ML
CANNABINOIDS SERPL QL: NEGATIVE
COCAINE UR QL: NEGATIVE
DEPRECATED RDW RBC AUTO: 43.3 FL (ref 37–54)
ERYTHROCYTE [DISTWIDTH] IN BLOOD BY AUTOMATED COUNT: 12.9 % (ref 11.5–14.5)
HCT VFR BLD AUTO: 40.9 % (ref 37–47)
HGB BLD-MCNC: 14 G/DL (ref 12–16)
MCH RBC QN AUTO: 31.2 PG (ref 27–31)
MCHC RBC AUTO-ENTMCNC: 34.2 G/DL (ref 31–37)
MCV RBC AUTO: 91.1 FL (ref 81–99)
METHADONE UR QL SCN: NEGATIVE
OPIATES UR QL: NEGATIVE
OXYCODONE UR QL SCN: NEGATIVE
PCP UR QL SCN: NEGATIVE
PLATELET # BLD AUTO: 220 10*3/MM3 (ref 140–500)
PMV BLD AUTO: 10.5 FL (ref 7.4–10.4)
PROPOXYPH UR QL: NEGATIVE
RBC # BLD AUTO: 4.49 10*6/MM3 (ref 4.2–5.4)
RH BLD: POSITIVE
T&S EXPIRATION DATE: NORMAL
TRICYCLICS UR QL SCN: NEGATIVE
WBC NRBC COR # BLD: 12.58 10*3/MM3 (ref 4.8–10.8)

## 2018-11-05 PROCEDURE — 94799 UNLISTED PULMONARY SVC/PX: CPT

## 2018-11-05 PROCEDURE — S0260 H&P FOR SURGERY: HCPCS | Performed by: OBSTETRICS & GYNECOLOGY

## 2018-11-05 PROCEDURE — 80306 DRUG TEST PRSMV INSTRMNT: CPT | Performed by: OBSTETRICS & GYNECOLOGY

## 2018-11-05 PROCEDURE — 25010000003 MORPHINE PER 10 MG: Performed by: ANESTHESIOLOGY

## 2018-11-05 PROCEDURE — 86900 BLOOD TYPING SEROLOGIC ABO: CPT | Performed by: OBSTETRICS & GYNECOLOGY

## 2018-11-05 PROCEDURE — 25010000002 KETOROLAC TROMETHAMINE PER 15 MG: Performed by: ANESTHESIOLOGY

## 2018-11-05 PROCEDURE — 85027 COMPLETE CBC AUTOMATED: CPT | Performed by: OBSTETRICS & GYNECOLOGY

## 2018-11-05 PROCEDURE — 59515 CESAREAN DELIVERY: CPT | Performed by: OBSTETRICS & GYNECOLOGY

## 2018-11-05 PROCEDURE — 25010000002 ONDANSETRON PER 1 MG: Performed by: ANESTHESIOLOGY

## 2018-11-05 PROCEDURE — 25010000003 CEFAZOLIN PER 500 MG: Performed by: OBSTETRICS & GYNECOLOGY

## 2018-11-05 PROCEDURE — 25010000002 NALOXONE PER 1 MG: Performed by: ANESTHESIOLOGY

## 2018-11-05 PROCEDURE — 25010000002 ONDANSETRON PER 1 MG: Performed by: OBSTETRICS & GYNECOLOGY

## 2018-11-05 PROCEDURE — 86850 RBC ANTIBODY SCREEN: CPT | Performed by: OBSTETRICS & GYNECOLOGY

## 2018-11-05 PROCEDURE — 25010000002 PHENYLEPHRINE PER 1 ML: Performed by: ANESTHESIOLOGY

## 2018-11-05 PROCEDURE — 86901 BLOOD TYPING SEROLOGIC RH(D): CPT | Performed by: OBSTETRICS & GYNECOLOGY

## 2018-11-05 RX ORDER — SODIUM CHLORIDE 0.9 % (FLUSH) 0.9 %
3 SYRINGE (ML) INJECTION EVERY 12 HOURS SCHEDULED
Status: DISCONTINUED | OUTPATIENT
Start: 2018-11-05 | End: 2018-11-05

## 2018-11-05 RX ORDER — OXYTOCIN/0.9 % SODIUM CHLORIDE 30/500 ML
PLASTIC BAG, INJECTION (ML) INTRAVENOUS
Status: COMPLETED
Start: 2018-11-05 | End: 2018-11-05

## 2018-11-05 RX ORDER — MONTELUKAST SODIUM 10 MG/1
10 TABLET ORAL DAILY
Status: DISCONTINUED | OUTPATIENT
Start: 2018-11-05 | End: 2018-11-07 | Stop reason: HOSPADM

## 2018-11-05 RX ORDER — SODIUM CHLORIDE 0.9 % (FLUSH) 0.9 %
1-10 SYRINGE (ML) INJECTION AS NEEDED
Status: DISCONTINUED | OUTPATIENT
Start: 2018-11-05 | End: 2018-11-05

## 2018-11-05 RX ORDER — OXYCODONE AND ACETAMINOPHEN 7.5; 325 MG/1; MG/1
1 TABLET ORAL EVERY 4 HOURS PRN
Status: DISPENSED | OUTPATIENT
Start: 2018-11-05 | End: 2018-11-06

## 2018-11-05 RX ORDER — OXYTOCIN/0.9 % SODIUM CHLORIDE 30/500 ML
PLASTIC BAG, INJECTION (ML) INTRAVENOUS CONTINUOUS PRN
Status: DISCONTINUED | OUTPATIENT
Start: 2018-11-05 | End: 2018-11-05 | Stop reason: SURG

## 2018-11-05 RX ORDER — ONDANSETRON 2 MG/ML
INJECTION INTRAMUSCULAR; INTRAVENOUS AS NEEDED
Status: DISCONTINUED | OUTPATIENT
Start: 2018-11-05 | End: 2018-11-05 | Stop reason: SURG

## 2018-11-05 RX ORDER — LABETALOL 100 MG/1
100 TABLET, FILM COATED ORAL EVERY 12 HOURS SCHEDULED
Status: DISCONTINUED | OUTPATIENT
Start: 2018-11-05 | End: 2018-11-07 | Stop reason: HOSPADM

## 2018-11-05 RX ORDER — ONDANSETRON 4 MG/1
4 TABLET, ORALLY DISINTEGRATING ORAL EVERY 6 HOURS PRN
Status: DISCONTINUED | OUTPATIENT
Start: 2018-11-05 | End: 2018-11-07 | Stop reason: HOSPADM

## 2018-11-05 RX ORDER — NICOTINE 21 MG/24HR
1 PATCH, TRANSDERMAL 24 HOURS TRANSDERMAL EVERY 24 HOURS
Status: DISCONTINUED | OUTPATIENT
Start: 2018-11-05 | End: 2018-11-07 | Stop reason: HOSPADM

## 2018-11-05 RX ORDER — OXYTOCIN/0.9 % SODIUM CHLORIDE 30/500 ML
PLASTIC BAG, INJECTION (ML) INTRAVENOUS
Status: DISPENSED
Start: 2018-11-05 | End: 2018-11-06

## 2018-11-05 RX ORDER — OXYCODONE HYDROCHLORIDE AND ACETAMINOPHEN 5; 325 MG/1; MG/1
1 TABLET ORAL EVERY 4 HOURS PRN
Status: ACTIVE | OUTPATIENT
Start: 2018-11-05 | End: 2018-11-06

## 2018-11-05 RX ORDER — BUPIVACAINE HYDROCHLORIDE 7.5 MG/ML
INJECTION, SOLUTION INTRASPINAL AS NEEDED
Status: DISCONTINUED | OUTPATIENT
Start: 2018-11-05 | End: 2018-11-05 | Stop reason: SURG

## 2018-11-05 RX ORDER — ONDANSETRON 2 MG/ML
4 INJECTION INTRAMUSCULAR; INTRAVENOUS ONCE AS NEEDED
Status: ACTIVE | OUTPATIENT
Start: 2018-11-05 | End: 2018-11-06

## 2018-11-05 RX ORDER — MIDAZOLAM HYDROCHLORIDE 1 MG/ML
INJECTION INTRAMUSCULAR; INTRAVENOUS
Status: DISCONTINUED
Start: 2018-11-05 | End: 2018-11-05 | Stop reason: WASHOUT

## 2018-11-05 RX ORDER — IPRATROPIUM BROMIDE AND ALBUTEROL SULFATE 2.5; .5 MG/3ML; MG/3ML
3 SOLUTION RESPIRATORY (INHALATION) EVERY 6 HOURS PRN
Status: DISCONTINUED | OUTPATIENT
Start: 2018-11-05 | End: 2018-11-07 | Stop reason: HOSPADM

## 2018-11-05 RX ORDER — LEVOTHYROXINE SODIUM 0.1 MG/1
100 TABLET ORAL DAILY
Status: DISCONTINUED | OUTPATIENT
Start: 2018-11-05 | End: 2018-11-07 | Stop reason: HOSPADM

## 2018-11-05 RX ORDER — OXYTOCIN/0.9 % SODIUM CHLORIDE 30/500 ML
85 PLASTIC BAG, INJECTION (ML) INTRAVENOUS ONCE
Status: COMPLETED | OUTPATIENT
Start: 2018-11-05 | End: 2018-11-05

## 2018-11-05 RX ORDER — BISACODYL 10 MG
10 SUPPOSITORY, RECTAL RECTAL DAILY PRN
Status: DISCONTINUED | OUTPATIENT
Start: 2018-11-05 | End: 2018-11-07 | Stop reason: HOSPADM

## 2018-11-05 RX ORDER — MISOPROSTOL 200 UG/1
800 TABLET ORAL AS NEEDED
Status: DISCONTINUED | OUTPATIENT
Start: 2018-11-05 | End: 2018-11-07 | Stop reason: HOSPADM

## 2018-11-05 RX ORDER — ONDANSETRON 4 MG/1
4 TABLET, FILM COATED ORAL EVERY 6 HOURS PRN
Status: DISCONTINUED | OUTPATIENT
Start: 2018-11-05 | End: 2018-11-07 | Stop reason: HOSPADM

## 2018-11-05 RX ORDER — DOCUSATE SODIUM 100 MG/1
CAPSULE, LIQUID FILLED ORAL
Status: COMPLETED
Start: 2018-11-05 | End: 2018-11-05

## 2018-11-05 RX ORDER — ONDANSETRON 2 MG/ML
4 INJECTION INTRAMUSCULAR; INTRAVENOUS EVERY 6 HOURS PRN
Status: DISCONTINUED | OUTPATIENT
Start: 2018-11-05 | End: 2018-11-07 | Stop reason: HOSPADM

## 2018-11-05 RX ORDER — MORPHINE SULFATE 0.5 MG/ML
INJECTION, SOLUTION EPIDURAL; INTRATHECAL; INTRAVENOUS AS NEEDED
Status: DISCONTINUED | OUTPATIENT
Start: 2018-11-05 | End: 2018-11-05 | Stop reason: SURG

## 2018-11-05 RX ORDER — SODIUM CHLORIDE, SODIUM LACTATE, POTASSIUM CHLORIDE, CALCIUM CHLORIDE 600; 310; 30; 20 MG/100ML; MG/100ML; MG/100ML; MG/100ML
125 INJECTION, SOLUTION INTRAVENOUS CONTINUOUS
Status: DISCONTINUED | OUTPATIENT
Start: 2018-11-05 | End: 2018-11-07 | Stop reason: HOSPADM

## 2018-11-05 RX ORDER — DOCUSATE SODIUM 100 MG/1
100 CAPSULE, LIQUID FILLED ORAL 2 TIMES DAILY
Status: DISCONTINUED | OUTPATIENT
Start: 2018-11-05 | End: 2018-11-07 | Stop reason: HOSPADM

## 2018-11-05 RX ORDER — OXYTOCIN/0.9 % SODIUM CHLORIDE 30/500 ML
650 PLASTIC BAG, INJECTION (ML) INTRAVENOUS ONCE
Status: COMPLETED | OUTPATIENT
Start: 2018-11-05 | End: 2018-11-05

## 2018-11-05 RX ORDER — PROMETHAZINE HYDROCHLORIDE 25 MG/ML
12.5 INJECTION, SOLUTION INTRAMUSCULAR; INTRAVENOUS EVERY 6 HOURS PRN
Status: DISCONTINUED | OUTPATIENT
Start: 2018-11-05 | End: 2018-11-07 | Stop reason: HOSPADM

## 2018-11-05 RX ORDER — FAMOTIDINE 10 MG/ML
INJECTION, SOLUTION INTRAVENOUS AS NEEDED
Status: DISCONTINUED | OUTPATIENT
Start: 2018-11-05 | End: 2018-11-05 | Stop reason: SURG

## 2018-11-05 RX ORDER — KETOROLAC TROMETHAMINE 30 MG/ML
30 INJECTION, SOLUTION INTRAMUSCULAR; INTRAVENOUS EVERY 6 HOURS
Status: DISPENSED | OUTPATIENT
Start: 2018-11-05 | End: 2018-11-06

## 2018-11-05 RX ORDER — SIMETHICONE 80 MG
80 TABLET,CHEWABLE ORAL 4 TIMES DAILY PRN
Status: DISCONTINUED | OUTPATIENT
Start: 2018-11-05 | End: 2018-11-07 | Stop reason: HOSPADM

## 2018-11-05 RX ORDER — MORPHINE SULFATE 0.5 MG/ML
INJECTION, SOLUTION EPIDURAL; INTRATHECAL; INTRAVENOUS
Status: COMPLETED
Start: 2018-11-05 | End: 2018-11-05

## 2018-11-05 RX ORDER — BUPIVACAINE HYDROCHLORIDE 7.5 MG/ML
INJECTION, SOLUTION INTRASPINAL
Status: COMPLETED
Start: 2018-11-05 | End: 2018-11-05

## 2018-11-05 RX ORDER — LANOLIN 100 %
OINTMENT (GRAM) TOPICAL
Status: DISCONTINUED | OUTPATIENT
Start: 2018-11-05 | End: 2018-11-07 | Stop reason: HOSPADM

## 2018-11-05 RX ORDER — PROMETHAZINE HYDROCHLORIDE 25 MG/1
25 TABLET ORAL EVERY 6 HOURS PRN
Status: DISCONTINUED | OUTPATIENT
Start: 2018-11-05 | End: 2018-11-07 | Stop reason: HOSPADM

## 2018-11-05 RX ORDER — KETOROLAC TROMETHAMINE 30 MG/ML
INJECTION, SOLUTION INTRAMUSCULAR; INTRAVENOUS AS NEEDED
Status: DISCONTINUED | OUTPATIENT
Start: 2018-11-05 | End: 2018-11-05 | Stop reason: SURG

## 2018-11-05 RX ORDER — NALOXONE HCL 0.4 MG/ML
VIAL (ML) INJECTION
Status: DISPENSED
Start: 2018-11-05 | End: 2018-11-06

## 2018-11-05 RX ORDER — CARBOPROST TROMETHAMINE 250 UG/ML
250 INJECTION, SOLUTION INTRAMUSCULAR AS NEEDED
Status: DISCONTINUED | OUTPATIENT
Start: 2018-11-05 | End: 2018-11-07 | Stop reason: HOSPADM

## 2018-11-05 RX ORDER — METHYLERGONOVINE MALEATE 0.2 MG/ML
200 INJECTION INTRAVENOUS ONCE AS NEEDED
Status: DISCONTINUED | OUTPATIENT
Start: 2018-11-05 | End: 2018-11-07 | Stop reason: HOSPADM

## 2018-11-05 RX ORDER — SCOLOPAMINE TRANSDERMAL SYSTEM 1 MG/1
PATCH, EXTENDED RELEASE TRANSDERMAL AS NEEDED
Status: DISCONTINUED | OUTPATIENT
Start: 2018-11-05 | End: 2018-11-05 | Stop reason: SURG

## 2018-11-05 RX ORDER — PROMETHAZINE HYDROCHLORIDE 25 MG/1
12.5 SUPPOSITORY RECTAL EVERY 6 HOURS PRN
Status: DISCONTINUED | OUTPATIENT
Start: 2018-11-05 | End: 2018-11-07 | Stop reason: HOSPADM

## 2018-11-05 RX ORDER — ALBUTEROL SULFATE 2.5 MG/3ML
2.5 SOLUTION RESPIRATORY (INHALATION) EVERY 6 HOURS PRN
Status: DISCONTINUED | OUTPATIENT
Start: 2018-11-05 | End: 2018-11-05 | Stop reason: CLARIF

## 2018-11-05 RX ORDER — LIDOCAINE HYDROCHLORIDE 10 MG/ML
5 INJECTION, SOLUTION EPIDURAL; INFILTRATION; INTRACAUDAL; PERINEURAL AS NEEDED
Status: DISCONTINUED | OUTPATIENT
Start: 2018-11-05 | End: 2018-11-05

## 2018-11-05 RX ORDER — OXYCODONE HYDROCHLORIDE AND ACETAMINOPHEN 5; 325 MG/1; MG/1
2 TABLET ORAL EVERY 4 HOURS PRN
Status: DISCONTINUED | OUTPATIENT
Start: 2018-11-06 | End: 2018-11-07 | Stop reason: HOSPADM

## 2018-11-05 RX ORDER — OXYCODONE HYDROCHLORIDE AND ACETAMINOPHEN 5; 325 MG/1; MG/1
1 TABLET ORAL EVERY 4 HOURS PRN
Status: DISCONTINUED | OUTPATIENT
Start: 2018-11-06 | End: 2018-11-07 | Stop reason: HOSPADM

## 2018-11-05 RX ADMIN — NALOXONE HYDROCHLORIDE 0.4 MG: 0.4 INJECTION, SOLUTION INTRAMUSCULAR; INTRAVENOUS; SUBCUTANEOUS at 12:08

## 2018-11-05 RX ADMIN — OXYTOCIN-SODIUM CHLORIDE 0.9% IV SOLN 30 UNIT/500ML 85 ML/HR: 30-0.9/5 SOLUTION at 09:30

## 2018-11-05 RX ADMIN — ONDANSETRON 4 MG: 2 INJECTION INTRAMUSCULAR; INTRAVENOUS at 11:59

## 2018-11-05 RX ADMIN — PHENYLEPHRINE HYDROCHLORIDE 100 MCG: 10 INJECTION INTRAVENOUS at 08:40

## 2018-11-05 RX ADMIN — CEFAZOLIN 3 G: 1 INJECTION, POWDER, FOR SOLUTION INTRAVENOUS at 08:14

## 2018-11-05 RX ADMIN — Medication: at 10:14

## 2018-11-05 RX ADMIN — DOCUSATE SODIUM 100 MG: 100 CAPSULE, LIQUID FILLED ORAL at 21:09

## 2018-11-05 RX ADMIN — DOCUSATE SODIUM 100 MG: 100 CAPSULE, LIQUID FILLED ORAL at 10:15

## 2018-11-05 RX ADMIN — KETOROLAC TROMETHAMINE 30 MG: 30 INJECTION, SOLUTION INTRAMUSCULAR at 21:09

## 2018-11-05 RX ADMIN — LABETALOL HYDROCHLORIDE 100 MG: 100 TABLET, FILM COATED ORAL at 10:14

## 2018-11-05 RX ADMIN — MONTELUKAST SODIUM 10 MG: 10 TABLET, COATED ORAL at 10:14

## 2018-11-05 RX ADMIN — LEVOTHYROXINE SODIUM 100 MCG: 100 TABLET ORAL at 10:14

## 2018-11-05 RX ADMIN — ONDANSETRON 4 MG: 2 INJECTION, SOLUTION INTRAMUSCULAR; INTRAVENOUS at 07:20

## 2018-11-05 RX ADMIN — SODIUM CHLORIDE, POTASSIUM CHLORIDE, SODIUM LACTATE AND CALCIUM CHLORIDE: 600; 310; 30; 20 INJECTION, SOLUTION INTRAVENOUS at 09:00

## 2018-11-05 RX ADMIN — KETOROLAC TROMETHAMINE 30 MG: 30 INJECTION, SOLUTION INTRAMUSCULAR at 15:24

## 2018-11-05 RX ADMIN — OXYCODONE HYDROCHLORIDE AND ACETAMINOPHEN 1 TABLET: 7.5; 325 TABLET ORAL at 13:14

## 2018-11-05 RX ADMIN — SCOPALAMINE 1 PATCH: 1 PATCH, EXTENDED RELEASE TRANSDERMAL at 07:20

## 2018-11-05 RX ADMIN — EPHEDRINE SULFATE 10 MG: 50 INJECTION, SOLUTION INTRAVENOUS at 08:45

## 2018-11-05 RX ADMIN — MORPHINE SULFATE 0.2 MG: 0.5 INJECTION EPIDURAL; INTRATHECAL; INTRAVENOUS at 08:34

## 2018-11-05 RX ADMIN — SODIUM CHLORIDE, POTASSIUM CHLORIDE, SODIUM LACTATE AND CALCIUM CHLORIDE 1000 ML: 600; 310; 30; 20 INJECTION, SOLUTION INTRAVENOUS at 07:20

## 2018-11-05 RX ADMIN — OXYTOCIN-SODIUM CHLORIDE 0.9% IV SOLN 30 UNIT/500ML 650 ML/HR: 30-0.9/5 SOLUTION at 09:15

## 2018-11-05 RX ADMIN — BUPIVACAINE HYDROCHLORIDE IN DEXTROSE 1.6 ML: 7.5 INJECTION, SOLUTION SUBARACHNOID at 08:32

## 2018-11-05 RX ADMIN — FAMOTIDINE 20 MG: 10 INJECTION, SOLUTION INTRAVENOUS at 07:24

## 2018-11-05 RX ADMIN — KETOROLAC TROMETHAMINE 30 MG: 30 INJECTION, SOLUTION INTRAMUSCULAR; INTRAVENOUS at 09:13

## 2018-11-05 RX ADMIN — OXYTOCIN-SODIUM CHLORIDE 0.9% IV SOLN 30 UNIT/500ML 350 ML/HR: 30-0.9/5 SOLUTION at 09:01

## 2018-11-05 RX ADMIN — SODIUM CHLORIDE, POTASSIUM CHLORIDE, SODIUM LACTATE AND CALCIUM CHLORIDE: 600; 310; 30; 20 INJECTION, SOLUTION INTRAVENOUS at 07:25

## 2018-11-05 NOTE — ANESTHESIA PREPROCEDURE EVALUATION
Anesthesia Evaluation     Patient summary reviewed and Nursing notes reviewed   no history of anesthetic complications:  NPO Solid Status: > 8 hours  NPO Liquid Status: > 8 hours           Airway   Mallampati: I  TM distance: >3 FB  Neck ROM: full  No difficulty expected  Comment: Tongue ring in-asked to remove  Dental - normal exam     Pulmonary     breath sounds clear to auscultation  (+) a smoker (1 ppd) Current Smoked day of surgery, COPD moderate, asthma,   Cardiovascular - normal exam    Rhythm: regular  Rate: normal    (+) hypertension (per chart),       Neuro/Psych  (+) headaches (migraines),     GI/Hepatic/Renal/Endo    (+) morbid obesity,  hypothyroidism,     Musculoskeletal     (+) back pain (denies back pain at present, but history of back pain in past.),   Abdominal   (+) obese,    Substance History   (+) drug use (history of THC)     OB/GYN          Other - negative ROS                       Anesthesia Plan    ASA 3     ITN and spinal   (Nausea and vomiting with po ibuprofen, but takes po naproxen without difficulty.  Discussed use of ketorolac with patient- will plan use and discontinue if not tolerated.  Risks and benefits of SAB with ITN discussed with patient ad accepted.)  intravenous induction   Anesthetic plan, all risks, benefits, and alternatives have been provided, discussed and informed consent has been obtained with: patient and spouse/significant other.

## 2018-11-05 NOTE — PLAN OF CARE
Problem: Patient Care Overview  Goal: Plan of Care Review  Outcome: Ongoing (interventions implemented as appropriate)   11/05/18 1822   Coping/Psychosocial   Plan of Care Reviewed With patient;spouse   Plan of Care Review   Progress improving     Goal: Individualization and Mutuality   11/05/18 1822   Individualization   Patient Specific Goals (Include Timeframe) breastfeed baby every 2-3 hours, call for feeding help, record feedings a diaper changes     Goal: Discharge Needs Assessment  Outcome: Ongoing (interventions implemented as appropriate)   11/05/18 1822   Discharge Needs Assessment   Readmission Within the Last 30 Days no previous admission in last 30 days   Concerns to be Addressed no discharge needs identified   Patient/Family Anticipates Transition to home

## 2018-11-05 NOTE — ANESTHESIA POSTPROCEDURE EVALUATION
Patient: Sarita Duncan    Procedure Summary     Date:  18 Room / Location:  [facility]   [facility]    Anesthesia Start:  821 Anesthesia Stop:  929    Procedure:   SECTION REPEAT (N/A Abdomen) Diagnosis:       Previous  section      (Previous  section [Z98.891])    Surgeon:  Ashu Smith MD Provider:      Anesthesia Type:  ITN, spinal ASA Status:  3          Anesthesia Type: ITN, spinal  Last vitals  BP   109/73 (18)   Temp   97.9 °F (36.6 °C) (18)   Pulse   74 (18)   Resp   16 (18)     SpO2         Post Anesthesia Care and Evaluation    Patient location during evaluation: bedside  Patient participation: complete - patient participated  Level of consciousness: awake and alert  Pain score: 0  Pain management: satisfactory to patient  Airway patency: patent  Anesthetic complications: No anesthetic complications  PONV Status: none  Cardiovascular status: acceptable  Respiratory status: acceptable  Hydration status: acceptable

## 2018-11-05 NOTE — H&P
Patient Care Team:  Rosa Nuñez APRN as PCP - General (Family Medicine)  Luis, Ashu Medina MD as PCP - Claims Attributed    Chief complaint Prior C/S x 3, IUP at 39 1/       HPI: This is a 33-year-old  6 para 21-3 with 3 prior C-sections who presents today for repeat low-transverse  section.  Her prenatal care is been complicated by history of preeclampsia in a previous pregnancy.  She also had a  delivery.  She is also a smoker and on thyroid replacement therapy for hypothyroidism.  Recently she's had slightly elevated blood pressures but does not meet the criteria for preeclampsia.  She currently reports active fetal movement no vaginal bleeding and no loss of fluid.    ROS:   Constitutional: Negative for appetite change, fever and unexpected weight change.   Respiratory: Negative for cough and shortness of breath.    Cardiovascular: Negative for chest pain and palpitations.   Gastrointestinal: Negative for abdominal distention, abdominal pain, constipation, diarrhea, nausea and vomiting.   Endocrine: Negative.    Genitourinary: Negative for pelvic pain and vaginal discharge.   Skin: Negative.    Neurological: Negative for dizziness and headaches.   Hematological: Negative.    Psychiatric/Behavioral: Negative for dysphoric mood and sleep disturbance. The patient is not nervous/anxious.        PMH:   Past Medical History:   Diagnosis Date   • Asthma    • COPD (chronic obstructive pulmonary disease) (CMS/[facility])    • Histoplasmosis    • Hypertension    • Hypothyroid    • Migraine    • Reactive airway disease    • Substance abuse (CMS/[facility])     thc         PSH:   Past Surgical History:   Procedure Laterality Date   •  SECTION     • WISDOM TOOTH EXTRACTION         SoHx:   Social History     Social History   • Marital status: Significant Other     Spouse name: N/A   • Number of children: N/A   • Years of education: N/A     Occupational History   • Not on file.      Social History Main Topics   • Smoking status: Current Every Day Smoker     Packs/day: 1.00     Years: 16.00     Types: Cigarettes   • Smokeless tobacco: Never Used   • Alcohol use No   • Drug use: Yes     Types: Marijuana   • Sexual activity: Yes     Partners: Male     Other Topics Concern   • Not on file     Social History Narrative   • No narrative on file       FHx:   Family History   Problem Relation Age of Onset   • Diabetes Father    • Heart disease Father    • COPD Father    • Diabetes Mother    • Breast cancer Mother    • No Known Problems Son    • No Known Problems Daughter    • Lung cancer Maternal Grandmother    • Lung cancer Maternal Grandfather    • No Known Problems Son        PGyn Hx: deferred    POBHx: 3 prior C/S    Allergies: Eggs or egg-derived products and Ibuprofen    Medications:   No current facility-administered medications on file prior to encounter.      Current Outpatient Prescriptions on File Prior to Encounter   Medication Sig Dispense Refill   • ipratropium (ATROVENT) 0.02 % nebulizer solution INHALE ONE VIAL IN NEBULIZER Q 4 H IF NEEDED  3   • ipratropium (ATROVENT) 0.03 % nasal spray 2 sprays into each nostril Every 4 (Four) Hours As Needed for Rhinitis.     • labetalol (NORMODYNE) 100 MG tablet Take 1 tablet by mouth Every 12 (Twelve) Hours. 60 tablet 1   • levothyroxine (SYNTHROID, LEVOTHROID) 100 MCG tablet Take 100 mcg by mouth Daily.  2   • montelukast (SINGULAIR) 10 MG tablet Take 10 mg by mouth Daily.  5   • VENTOLIN  (90 Base) MCG/ACT inhaler INL 1 TO 2 PFS PO Q 4 TO 6 H PRN  1   • BREO ELLIPTA 100-25 MCG/INH aerosol powder    5   • ipratropium-albuterol (DUO-NEB) 0.5-2.5 mg/mL nebulizer USE 1 VIAL IN NEBULIZER Q 6 H PRN  2   • nicotine (NICODERM CQ) 14 MG/24HR patch Place 1 patch on the skin Daily. 30 patch 3   • ondansetron (ZOFRAN) 4 MG tablet TAKE 1 TABLET BY MOUTH EVERY 4 HOURS AS NEEDED FOR NAUSEA OR VOMITING 30 tablet 0             Vital Signs  Wt 134 kg  (295 lb 6.7 oz)   LMP 02/04/2018   Breastfeeding? Yes   BMI 42.39 kg/m²          Physical Exam:  Constitutional: She is oriented to person, place, and time. She appears well-developed and well-nourished.   HENT:   Head: Normocephalic and atraumatic.   Cardiovascular: Normal rate and regular rhythm.    Pulmonary/Chest: Effort normal. No respiratory distress.   Abdominal: Soft. Bowel sounds are normal. There is no fundal tenderness. There is no rebound and no guarding.   Musculoskeletal: Normal range of motion.   Neurological: She is alert and oriented to person, place, and time.   Skin: Skin is warm and dry.   Psychiatric: She has a normal mood and affect. Her behavior is normal. Judgment and thought content normal.   Nursing note and vitals reviewed.  Debilities/Disabilities Identified: None  Emotional Behavior: Appropriate    Labs: B+, GBS +      Assessment/Plan:  IUP at 39 1/7     Prior C/S x 3     Prior PTD     Hx of preeclampsia     Hypothyriodism     GHTN     GBS +    Plan RLTCS, declines BTL    I discussed the patients findings and my recommendations with patient and family.     Ashu Smith MD  11/05/18  8:15 AM

## 2018-11-05 NOTE — ANESTHESIA PROCEDURE NOTES
Spinal Block    Pre-sedation assessment completed: 11/5/2018 8:25 AM    Patient reassessed immediately prior to procedure    Patient location during procedure: OR  Start Time: 11/5/2018 8:27 AM  Stop Time: 11/5/2018 8:33 AM  Performed By  Anesthesiologist: HILDA RHODES  Preanesthetic Checklist  Completed: patient identified, site marked, surgical consent, pre-op evaluation, timeout performed, IV checked, risks and benefits discussed and monitors and equipment checked  Spinal Block Prep:  Patient Position:sitting  Sterile Tech:cap, gloves, mask and sterile barriers  Prep:Chloraprep  Patient Monitoring:blood pressure monitoring, continuous pulse oximetry and EKG  Spinal Block Procedure  Approach:midline  Location:L3-L4  Needle Type:Sprotte (120 mm)  Needle Gauge:24 G  Placement of Spinal needle event:cerebrospinal fluid aspirated  Paresthesia: no  Fluid Appearance:clear  Post Assessment  Patient Tolerance:patient tolerated the procedure well with no apparent complications  Complications no

## 2018-11-05 NOTE — PLAN OF CARE
Problem: Postpartum ( Delivery) (Adult,Obstetrics,Pediatric)  Goal: Signs and Symptoms of Listed Potential Problems Will be Absent, Minimized or Managed (Postpartum)  Outcome: Ongoing (interventions implemented as appropriate)   18   Goal/Outcome Evaluation   Problems Assessed (Postpartum ) all   Problems Present (Postpartum ) none     Goal: Anesthesia/Sedation Recovery  Outcome: Ongoing (interventions implemented as appropriate)   18   Goal/Outcome Evaluation   Anesthesia/Sedation Recovery progressing toward baseline       Problem: Breastfeeding (Adult,Obstetrics,Pediatric)  Goal: Signs and Symptoms of Listed Potential Problems Will be Absent, Minimized or Managed (Breastfeeding)  Outcome: Ongoing (interventions implemented as appropriate)   18   Goal/Outcome Evaluation   Problems Assessed (Breastfeeding) all   Problems Present (Breastfeeding) none

## 2018-11-06 PROBLEM — Z34.93 PRENATAL CARE IN THIRD TRIMESTER: Status: ACTIVE | Noted: 2018-11-06

## 2018-11-06 LAB
BASOPHILS # BLD AUTO: 0.03 10*3/MM3 (ref 0–0.2)
BASOPHILS NFR BLD AUTO: 0.2 % (ref 0–2)
DEPRECATED RDW RBC AUTO: 43.9 FL (ref 37–54)
EOSINOPHIL # BLD AUTO: 0.15 10*3/MM3 (ref 0.1–0.3)
EOSINOPHIL NFR BLD AUTO: 1.2 % (ref 0–4)
ERYTHROCYTE [DISTWIDTH] IN BLOOD BY AUTOMATED COUNT: 13 % (ref 11.5–14.5)
HCT VFR BLD AUTO: 34.6 % (ref 37–47)
HGB BLD-MCNC: 11.7 G/DL (ref 12–16)
IMM GRANULOCYTES # BLD: 0.04 10*3/MM3 (ref 0–0.03)
IMM GRANULOCYTES NFR BLD: 0.3 % (ref 0–0.5)
LYMPHOCYTES # BLD AUTO: 2.58 10*3/MM3 (ref 0.6–4.8)
LYMPHOCYTES NFR BLD AUTO: 20.8 % (ref 20–45)
MCH RBC QN AUTO: 31.3 PG (ref 27–31)
MCHC RBC AUTO-ENTMCNC: 33.8 G/DL (ref 31–37)
MCV RBC AUTO: 92.5 FL (ref 81–99)
MONOCYTES # BLD AUTO: 0.91 10*3/MM3 (ref 0–1)
MONOCYTES NFR BLD AUTO: 7.3 % (ref 3–8)
NEUTROPHILS # BLD AUTO: 8.72 10*3/MM3 (ref 1.5–8.3)
NEUTROPHILS NFR BLD AUTO: 70.2 % (ref 45–70)
NRBC BLD MANUAL-RTO: 0 /100 WBC (ref 0–0)
PLATELET # BLD AUTO: 181 10*3/MM3 (ref 140–500)
PMV BLD AUTO: 10.5 FL (ref 7.4–10.4)
RBC # BLD AUTO: 3.74 10*6/MM3 (ref 4.2–5.4)
WBC NRBC COR # BLD: 12.43 10*3/MM3 (ref 4.8–10.8)

## 2018-11-06 PROCEDURE — 85025 COMPLETE CBC W/AUTO DIFF WBC: CPT | Performed by: OBSTETRICS & GYNECOLOGY

## 2018-11-06 PROCEDURE — 25010000002 KETOROLAC TROMETHAMINE PER 15 MG: Performed by: ANESTHESIOLOGY

## 2018-11-06 PROCEDURE — 99024 POSTOP FOLLOW-UP VISIT: CPT | Performed by: NURSE PRACTITIONER

## 2018-11-06 RX ORDER — IBUPROFEN 800 MG/1
TABLET ORAL
Status: COMPLETED
Start: 2018-11-06 | End: 2018-11-06

## 2018-11-06 RX ORDER — IBUPROFEN 800 MG/1
800 TABLET ORAL EVERY 8 HOURS PRN
Status: DISCONTINUED | OUTPATIENT
Start: 2018-11-06 | End: 2018-11-07 | Stop reason: HOSPADM

## 2018-11-06 RX ADMIN — DOCUSATE SODIUM 100 MG: 100 CAPSULE, LIQUID FILLED ORAL at 08:51

## 2018-11-06 RX ADMIN — IBUPROFEN 800 MG: 800 TABLET ORAL at 11:18

## 2018-11-06 RX ADMIN — LEVOTHYROXINE SODIUM 100 MCG: 100 TABLET ORAL at 08:51

## 2018-11-06 RX ADMIN — KETOROLAC TROMETHAMINE 30 MG: 30 INJECTION, SOLUTION INTRAMUSCULAR at 03:30

## 2018-11-06 RX ADMIN — OXYCODONE HYDROCHLORIDE AND ACETAMINOPHEN 2 TABLET: 5; 325 TABLET ORAL at 18:24

## 2018-11-06 RX ADMIN — OXYCODONE HYDROCHLORIDE AND ACETAMINOPHEN 2 TABLET: 5; 325 TABLET ORAL at 12:24

## 2018-11-06 RX ADMIN — OXYCODONE HYDROCHLORIDE AND ACETAMINOPHEN 2 TABLET: 5; 325 TABLET ORAL at 23:44

## 2018-11-06 RX ADMIN — OXYCODONE HYDROCHLORIDE AND ACETAMINOPHEN 1 TABLET: 7.5; 325 TABLET ORAL at 07:28

## 2018-11-06 RX ADMIN — DOCUSATE SODIUM 100 MG: 100 CAPSULE, LIQUID FILLED ORAL at 21:11

## 2018-11-06 RX ADMIN — MONTELUKAST SODIUM 10 MG: 10 TABLET, COATED ORAL at 08:51

## 2018-11-06 NOTE — PLAN OF CARE
Problem: Patient Care Overview  Goal: Plan of Care Review   18   Coping/Psychosocial   Plan of Care Reviewed With patient   Plan of Care Review   Progress improving   OTHER   Outcome Summary pain meds as needed for incisional pain, shower, passing gas, tolerating reg diet, minimal vaginal bleeding, fundus 1cm below umbilicus     Goal: Individualization and Mutuality   18   Individualization   Patient Specific Goals (Include Timeframe) ambulate tid   Patient Specific Interventions pt ambulating in her room TID     Goal: Discharge Needs Assessment   18   Discharge Needs Assessment   Readmission Within the Last 30 Days no previous admission in last 30 days   Concerns to be Addressed no discharge needs identified   Patient/Family Anticipates Transition to home   Transportation Concerns car, none   Anticipated Changes Related to Illness none   Equipment Needed After Discharge none   Offered/Gave Vendor List no   Disability   Equipment Currently Used at Home none       Problem: Postpartum ( Delivery) (Adult,Obstetrics,Pediatric)  Intervention: Support Surgical and Anesthesia Recovery   18   Safety Management   Safety Promotion/Fall Prevention nonskid shoes/slippers when out of bed;safety round/check completed   Respiratory Interventions   Airway/Ventilation Management airway patency maintained   Incentive Spirometer (IS)   Number of Repetitions (IS) 10   Level Incentive Spirometer (mL) 2000   Patient Tolerance (IS) good     Intervention: Monitor/Manage Pain   18   Safety Management   Medication Review/Management medications reviewed   Promote Effective Bowel Elimination   Bowel Intervention adequate fluid intake promoted   Promote Oxygenation/Perfusion   Pain Management Interventions pain management plan reviewed with patient/caregiver;position adjusted;see MAR     Intervention: Support Life/Role Transition   18   Psychosocial Support    Family/Support System Care caregiver stress acknowledged;involvement promoted   Interventions   Trust Relationship/Rapport care explained;choices provided;emotional support provided;empathic listening provided;questions answered;questions encouraged;thoughts/feelings acknowledged   Coping/Psychosocial Interventions   Environmental Support calm environment promoted;rooming-in facilitated   Parent/Child Attachment Promotion interaction encouraged;parent/caregiver presence encouraged;participation in care promoted;skin-to-skin contact encouraged;rooming-in promoted;positive reinforcement provided     Intervention: Minimize/Manage Infection Risk   18   Hygiene Care   Perineal Care absorbent pad changed;perineum cleansed   Genitourinary () Interventions   Urinary Elimination Promotion absorbent pad/diaper use encouraged;catheter patency maintained       Goal: Signs and Symptoms of Listed Potential Problems Will be Absent, Minimized or Managed (Postpartum)   18   Goal/Outcome Evaluation   Problems Assessed (Postpartum ) all   Problems Present (Postpartum ) none       Problem: Breastfeeding (Adult,Obstetrics,Pediatric)  Intervention: Promote Breast Care and Comfort   18   Reproductive Interventions   Breast Care: Breastfeeding breast milk to nipples;lanolin to nipples   Breast Pumping   Breast Pumping bilateral breasts pumped until soft;double electric breast pump utilized     Intervention: Provide Support During Feeding Sessions   18   Coping/Psychosocial Interventions   Parent/Child Attachment Promotion interaction encouraged;parent/caregiver presence encouraged;participation in care promoted;skin-to-skin contact encouraged;rooming-in promoted;positive reinforcement provided     Intervention: Promote Positive Maternal Experience   18   Coping/Psychosocial Interventions   Supportive Measures active listening utilized;goal setting  facilitated;verbalization of feelings encouraged   Promote Positive Maternal Experience   Breastfeeding Support diary/feeding log utilized;encouragement provided;lactation counseling provided;infant-mother separation minimized;maternal nutrition promoted;maternal rest encouraged       Goal: Signs and Symptoms of Listed Potential Problems Will be Absent, Minimized or Managed (Breastfeeding)   11/06/18 6968   Goal/Outcome Evaluation   Problems Assessed (Breastfeeding) all   Problems Present (Breastfeeding) none

## 2018-11-06 NOTE — PROGRESS NOTES
Patient: Sarita Duncan  Procedure(s):   SECTION REPEAT  Anesthesia type: [unfilled]    Patient location: Labor and Delivery  Vitals:    18 0438 18 0440 18 0453 18 0742   BP: 114/63 114/63 93/63 105/69   BP Location:   Left arm Left arm   Patient Position:   Sitting Lying   Pulse: 65 65 64 64   Resp:   18 20   Temp:   98 °F (36.7 °C) 98 °F (36.7 °C)   TempSrc:   Oral Oral   SpO2:    98%   Weight:         Level of consciousness: awake, alert and oriented    Post-anesthesia pain: adequate analgesia  Airway patency: patent  Respiratory: unassisted  Cardiovascular: stable and blood pressure at baseline  Hydration: euvolemic    Anesthetic complications: no

## 2018-11-06 NOTE — PLAN OF CARE
Problem: Patient Care Overview  Goal: Discharge Needs Assessment  Outcome: Ongoing (interventions implemented as appropriate)   11/05/18 1822 11/06/18 1101 11/06/18 1103   Discharge Needs Assessment   Readmission Within the Last 30 Days no previous admission in last 30 days --  --    Concerns to be Addressed --  denies needs/concerns at this time --    Patient/Family Anticipates Transition to --  home with family --    Transportation Anticipated --  family or friend will provide --    Equipment Needed After Discharge --  --  nebulizer   Discharge Coordination/Progress --  --  --    Disability   Equipment Currently Used at Home --  none --     11/06/18 1112   Discharge Needs Assessment   Readmission Within the Last 30 Days --    Concerns to be Addressed --    Patient/Family Anticipates Transition to --    Transportation Anticipated --    Equipment Needed After Discharge --    Discharge Coordination/Progress Patient in agreement with speaking to  with S.O. at bedside. She lives with jayce and three dependent children. She is independent with ADLs. She has a nebulizer. She has no home health, other medical equipment, or oxygen. Patient fills scripts at [facility] in [place] on [address] and has no issues getting or paying for her medicine. She has insurance coverage for her medicines. Patient says she is anticipating going home tomorrow.  will continue to follow.    Disability   Equipment Currently Used at Home --

## 2018-11-06 NOTE — PLAN OF CARE
Problem: Patient Care Overview  Goal: Plan of Care Review  Outcome: Ongoing (interventions implemented as appropriate)   11/06/18 9580   Coping/Psychosocial   Plan of Care Reviewed With patient;significant other   Plan of Care Review   Progress improving   OTHER   Outcome Summary pain wll controlled, moves well

## 2018-11-06 NOTE — NURSING NOTE
Discharge Planning Assessment  ROBERT Hernandez     Patient Name: Sarita Duncan  MRN: [id number]  Today's Date: 11/6/2018    Admit Date: 11/5/2018          Discharge Needs Assessment     Row Name 11/06/18 1103       Discharge Needs Assessment    Equipment Needed After Discharge nebulizer    Row Name 11/06/18 1101       Living Environment    Lives With significant other;child(estefany), dependent    Current Living Arrangements home/apartment/condo    Primary Care Provided by self    Provides Primary Care For child(estefany)    Family Caregiver if Needed significant other    Quality of Family Relationships supportive;involved    Able to Return to Prior Arrangements yes       Resource/Environmental Concerns    Resource/Environmental Concerns none       Transition Planning    Patient/Family Anticipates Transition to home with family    Transportation Anticipated family or friend will provide       Discharge Needs Assessment    Concerns to be Addressed denies needs/concerns at this time    Equipment Currently Used at Home none            Discharge Plan     Row Name 11/06/18 1103       Plan    Plan Comments Patient in agreement with speaking to  with S.O. at bedside.  She lives with fiance and three dependent children.  She is independent with ADLs. She has a nebulizer.  She has no home health, other medical equipment, or oxygen.  Patient fills scripts at [facility] in [place] on [address] and has no issues getting or paying for her medicine.  She has insurance coverage for her medicines.  She has all needed supplies for baby; clothes, diapers, car seat and crib.  Patient's mother is assisting with her other children and will be able to assist once patient is home.  Patient says she is anticipating going home tomorrow.   will continue to follow.         Destination     No service coordination in this encounter.      Durable Medical Equipment     No service coordination in this encounter.       Dialysis/Infusion     No service coordination in this encounter.      Home Medical Care     No service coordination in this encounter.      Social Care     No service coordination in this encounter.                Demographic Summary     Row Name 11/06/18 1100       General Information    Admission Type inpatient    Arrived From home    Referral Source admission list    Reason for Consult discharge planning    Preferred Language English     Used During This Interaction no       Contact Information    Permission Granted to Share Info With             Functional Status    No documentation.           Psychosocial    No documentation.           Abuse/Neglect    No documentation.           Legal    No documentation.           Substance Abuse    No documentation.           Patient Forms    No documentation.         Marya Gardner RN

## 2018-11-06 NOTE — PROGRESS NOTES
ROBERT Hernandez   PROGRESS NOTE    Post-Op Day 1 S/P   Subjective   Subjective  Patient reports:  Pain is well controlled with prescribed pain  medication.  She is up ambulating. Tolerating diet. Tolerating po -- normal for liquids and normal for solids.  Intake -- c/o of tolerating po solids and tolerating po liquids.   Voiding - without difficulty; flatus reported..  Vaginal bleeding is as much as expected.    Objective    Objective     Vitals: Vital Signs Range for the last 24 hours  Temperature: Temp:  [97.6 °F (36.4 °C)-98 °F (36.7 °C)] 98 °F (36.7 °C)   Temp Source: Temp src: Oral   BP: BP: ()/(58-86) 105/69   Pulse: Heart Rate:  [60-74] 64   Respirations: Resp:  [16-21] 20   SPO2: SpO2:  [97 %-99 %] 98 %   O2 Amount (l/min):     O2 Devices Device (Oxygen Therapy): room air   Weight:              Physical Exam    Lungs clear to auscultation bilaterally   Abdomen Soft, fundus firm. Bowel sounds hypoactive.    Incision  Dressing C/D/I    Extremities edema trace edema and Homans sign is negative, no sign of DVT     I reviewed the patient's new clinical results.    Assessment/Plan        Previous  section x 3- plan RLTCS at 39 weeks    Assessment & Plan    Assessment:    Sarita Duncan is Day 1  post-partum  , Low Transverse    .      Plan:  1) Postpartum day #1- Progressing well. Hgb 11.7g/dL. Rh +.      2) Postpartum care- advance. D/C saline lock.    3) Male infant- desires circumcision. R/B/A disc with patient. Breast feeding.     4) Chronic hypertension- Taking Labetalol 100mg BID. Morning dose was held d/t BP of 105/69. Will continue to monitor.     5) Dispo- Consider discharge tomorrow if patient is feeling well or Thursday.       Wendie Dunlap, LISBETH  18  8:54 AM

## 2018-11-07 VITALS
DIASTOLIC BLOOD PRESSURE: 78 MMHG | WEIGHT: 293 LBS | BODY MASS INDEX: 42.39 KG/M2 | HEART RATE: 66 BPM | TEMPERATURE: 97.9 F | RESPIRATION RATE: 18 BRPM | OXYGEN SATURATION: 98 % | SYSTOLIC BLOOD PRESSURE: 121 MMHG

## 2018-11-07 PROCEDURE — 99024 POSTOP FOLLOW-UP VISIT: CPT | Performed by: NURSE PRACTITIONER

## 2018-11-07 RX ORDER — MEDROXYPROGESTERONE ACETATE 150 MG/ML
150 INJECTION, SUSPENSION INTRAMUSCULAR ONCE
Status: COMPLETED | OUTPATIENT
Start: 2018-11-07 | End: 2018-11-07

## 2018-11-07 RX ORDER — PSEUDOEPHEDRINE HCL 30 MG
100 TABLET ORAL 2 TIMES DAILY PRN
Qty: 60 CAPSULE | Refills: 0 | COMMUNITY
Start: 2018-11-07 | End: 2020-11-23

## 2018-11-07 RX ORDER — PNV NO.95/FERROUS FUM/FOLIC AC 28MG-0.8MG
1 TABLET ORAL DAILY
Qty: 30 TABLET | Refills: 11 | Status: SHIPPED | OUTPATIENT
Start: 2018-11-07

## 2018-11-07 RX ORDER — OXYCODONE HYDROCHLORIDE AND ACETAMINOPHEN 5; 325 MG/1; MG/1
2 TABLET ORAL EVERY 4 HOURS PRN
Qty: 30 TABLET | Refills: 0 | Status: SHIPPED | OUTPATIENT
Start: 2018-11-07 | End: 2018-11-10

## 2018-11-07 RX ORDER — MEDROXYPROGESTERONE ACETATE 150 MG/ML
INJECTION, SUSPENSION INTRAMUSCULAR
Status: COMPLETED
Start: 2018-11-07 | End: 2018-11-07

## 2018-11-07 RX ADMIN — OXYCODONE HYDROCHLORIDE AND ACETAMINOPHEN 2 TABLET: 5; 325 TABLET ORAL at 08:59

## 2018-11-07 RX ADMIN — LEVOTHYROXINE SODIUM 100 MCG: 100 TABLET ORAL at 08:54

## 2018-11-07 RX ADMIN — MEDROXYPROGESTERONE ACETATE 150 MG: 150 INJECTION, SUSPENSION INTRAMUSCULAR at 10:28

## 2018-11-07 RX ADMIN — OXYCODONE HYDROCHLORIDE AND ACETAMINOPHEN 2 TABLET: 5; 325 TABLET ORAL at 04:48

## 2018-11-07 RX ADMIN — DOCUSATE SODIUM 100 MG: 100 CAPSULE, LIQUID FILLED ORAL at 08:54

## 2018-11-07 NOTE — PLAN OF CARE
Problem: Patient Care Overview  Goal: Plan of Care Review  Outcome: Ongoing (interventions implemented as appropriate)   18 0606   Coping/Psychosocial   Plan of Care Reviewed With patient   Plan of Care Review   Progress improving   OTHER   Outcome Summary vss, prn meds per pt request, up ad little, due for discharge today      Goal: Individualization and Mutuality  Outcome: Ongoing (interventions implemented as appropriate)    Goal: Discharge Needs Assessment  Outcome: Ongoing (interventions implemented as appropriate)    Goal: Interprofessional Rounds/Family Conf  Outcome: Ongoing (interventions implemented as appropriate)      Problem: Postpartum ( Delivery) (Adult,Obstetrics,Pediatric)  Goal: Signs and Symptoms of Listed Potential Problems Will be Absent, Minimized or Managed (Postpartum)  Outcome: Ongoing (interventions implemented as appropriate)    Goal: Anesthesia/Sedation Recovery  Outcome: Ongoing (interventions implemented as appropriate)      Problem: Breastfeeding (Adult,Obstetrics,Pediatric)  Goal: Signs and Symptoms of Listed Potential Problems Will be Absent, Minimized or Managed (Breastfeeding)  Outcome: Ongoing (interventions implemented as appropriate)

## 2018-11-07 NOTE — DISCHARGE SUMMARY
Obstetrical Discharge Form    Primary OB Clinician: SONYA      Gestational Age: 39.1     Antepartum complications: Chronic HTN, smoker, previous  x 3     Date of Delivery:  2018     Delivered By: Ashu Mcpherson     Delivery Type: repeat  section, low transverse incision    Tubal Ligation: n/a    Baby: Liveborn male, Apgars 9/9, weight 6 #, 15.3 oz,     Anesthesia: spinal    Intrapartum complications: None    Laceration: none    Feeding method: breast    Discharge Date: 2018; Discharge Time: 9:44 AM    /78 (BP Location: Right arm, Patient Position: Lying)   Pulse 66   Temp 97.9 °F (36.6 °C) (Oral)   Resp 18   Wt 134 kg (295 lb 6.7 oz)   LMP 2018   SpO2 98%   Breastfeeding? Yes   BMI 42.39 kg/m²      Abd: soft, fundus firm, incision C/D/I. Bowel sounds +.  Ext: Trace edema, neg kristina's sign. No cords.     Results from last 7 days  Lab Units 18  0448   HEMOGLOBIN g/dL 11.7*       Plan:    Patient given written instruction sheet.  Follow-up appointment with [facility] in 2 weeks.    LISBETH Miller  9:44 AM  2018

## 2018-11-29 ENCOUNTER — POSTPARTUM VISIT (OUTPATIENT)
Dept: OBSTETRICS AND GYNECOLOGY | Facility: CLINIC | Age: 33
End: 2018-11-29

## 2018-11-29 VITALS
HEIGHT: 70 IN | WEIGHT: 279 LBS | DIASTOLIC BLOOD PRESSURE: 92 MMHG | SYSTOLIC BLOOD PRESSURE: 122 MMHG | BODY MASS INDEX: 39.94 KG/M2

## 2018-11-29 PROCEDURE — 0503F POSTPARTUM CARE VISIT: CPT | Performed by: OBSTETRICS & GYNECOLOGY

## 2018-11-29 NOTE — PROGRESS NOTES
"      Sarita Duncan is a 33 y.o. patient who presents for follow up of   Chief Complaint   Patient presents with   • Postpartum Care       HPI Here for 3 weeks PP check, no baby blues, spotting only. Got Depo in hospital.     The following portions of the patient's history were reviewed and updated as appropriate: allergies, current medications and problem list.    Review of Systems   Constitutional: Negative for appetite change, fever and unexpected weight change.   HENT: Negative for congestion and sore throat.    Respiratory: Negative for cough and shortness of breath.    Cardiovascular: Negative for chest pain and palpitations.   Gastrointestinal: Negative for abdominal distention, abdominal pain, constipation, diarrhea, nausea and vomiting.   Endocrine: Negative.    Genitourinary: Negative for dyspareunia, menstrual problem, pelvic pain and vaginal discharge.   Skin: Negative.    Neurological: Negative for dizziness and syncope.   Hematological: Negative.    Psychiatric/Behavioral: Negative for dysphoric mood and sleep disturbance. The patient is not nervous/anxious.        /92   Ht 177.8 cm (70\")   Wt 127 kg (279 lb)   LMP 02/04/2018   BMI 40.03 kg/m²     Physical Exam   Constitutional: She is oriented to person, place, and time. She appears well-developed and well-nourished.   HENT:   Head: Normocephalic and atraumatic.   Pulmonary/Chest: Effort normal.   Abdominal: Soft. Bowel sounds are normal. She exhibits no distension and no mass. There is no tenderness. There is no rebound and no guarding.   C/S incision well healed   Genitourinary: Vagina normal and uterus normal.   Musculoskeletal: Normal range of motion.   Neurological: She is alert and oriented to person, place, and time.   Skin: Skin is warm and dry.   Psychiatric: She has a normal mood and affect. Her behavior is normal. Judgment and thought content normal.   Nursing note and vitals reviewed.        Assessment/Plan    Sairta was seen " today for postpartum care.    Diagnoses and all orders for this visit:    Postpartum care and examination    Final visit in 3 weeks    Return in about 3 weeks (around 12/20/2018) for Postpartum.      Ashu Smith MD  11/29/2018  2:16 PM

## 2020-09-17 LAB
EXTERNAL ABO GROUPING: NORMAL
EXTERNAL RH FACTOR: POSITIVE
RUBV IGG SERPL IA-ACNC: NORMAL

## 2020-11-23 ENCOUNTER — OFFICE VISIT (OUTPATIENT)
Dept: OBSTETRICS AND GYNECOLOGY | Facility: CLINIC | Age: 35
End: 2020-11-23

## 2020-11-23 VITALS
DIASTOLIC BLOOD PRESSURE: 80 MMHG | WEIGHT: 293 LBS | BODY MASS INDEX: 41.95 KG/M2 | SYSTOLIC BLOOD PRESSURE: 118 MMHG | HEIGHT: 70 IN

## 2020-11-23 DIAGNOSIS — E02 SUBCLINICAL IODINE-DEFICIENCY HYPOTHYROIDISM: ICD-10-CM

## 2020-11-23 DIAGNOSIS — N92.6 MISSED MENSES: ICD-10-CM

## 2020-11-23 DIAGNOSIS — Z87.51 HISTORY OF PRETERM DELIVERY: ICD-10-CM

## 2020-11-23 DIAGNOSIS — Z13.9 SCREENING FOR CONDITION: Primary | ICD-10-CM

## 2020-11-23 DIAGNOSIS — F17.200 SMOKER: ICD-10-CM

## 2020-11-23 DIAGNOSIS — O09.299 HX OF PREECLAMPSIA, PRIOR PREGNANCY, CURRENTLY PREGNANT: ICD-10-CM

## 2020-11-23 DIAGNOSIS — J45.40 MODERATE PERSISTENT ASTHMA WITHOUT COMPLICATION: ICD-10-CM

## 2020-11-23 PROBLEM — Z34.93 PRENATAL CARE IN THIRD TRIMESTER: Status: RESOLVED | Noted: 2018-11-06 | Resolved: 2020-11-23

## 2020-11-23 PROBLEM — Z34.90 PREGNANCY: Status: RESOLVED | Noted: 2018-10-11 | Resolved: 2020-11-23

## 2020-11-23 PROBLEM — O16.3 ELEVATED BLOOD PRESSURE AFFECTING PREGNANCY IN THIRD TRIMESTER, ANTEPARTUM: Status: RESOLVED | Noted: 2018-10-11 | Resolved: 2020-11-23

## 2020-11-23 LAB
B-HCG UR QL: POSITIVE
INTERNAL NEGATIVE CONTROL: NEGATIVE
INTERNAL POSITIVE CONTROL: POSITIVE
Lab: 55

## 2020-11-23 PROCEDURE — 81025 URINE PREGNANCY TEST: CPT | Performed by: OBSTETRICS & GYNECOLOGY

## 2020-11-23 PROCEDURE — 99214 OFFICE O/P EST MOD 30 MIN: CPT | Performed by: OBSTETRICS & GYNECOLOGY

## 2020-11-23 RX ORDER — LEVOTHYROXINE SODIUM 0.12 MG/1
125 TABLET ORAL DAILY
COMMUNITY
Start: 2020-11-02 | End: 2020-12-08 | Stop reason: SDUPTHER

## 2020-11-23 NOTE — PROGRESS NOTES
OB CONFIRMATION APPOINTMENT    CC- Pt has had a menstrual irregularity    Chief Complaint   Patient presents with   • Amenorrhea        HISTORY OF PRESENT ILLNESS:    Amenorrhea  This is a new problem. The current episode started more than 1 month ago. The problem occurs constantly. The problem has been unchanged. Pertinent negatives include no abdominal pain, fever or vertigo. Nothing aggravates the symptoms.       Sarita MACKEY is being seen today to confirm she is pregnant.    She is a 35 y.o.  Patient's last menstrual period was 2020 (exact date)..  EDC= 21.  Gestational age is 18wks     Current obstetric complaints : none.  Pt had insurance issues.      Prior obstetric issues, potential pregnancy concerns: 4 previous C/S.  Counseled.     Family history of genetic issues (includes FOB): none    OFFERED GENETIC TESTING: CfDNA, Cystic fibrosis, Spinal muscular atrophy, Fragile X syndrome: yes    Prior infections concerning in pregnancy (Rash, fever in last 2 weeks): none.     HISTORY REVIEWED:      Past Medical History:   Diagnosis Date   • Asthma    • COPD (chronic obstructive pulmonary disease) (CMS/HCC)    • Histoplasmosis    • Hypertension    • Hypothyroid    • Migraine    • Reactive airway disease    • Substance abuse (CMS/HCC)     thc       Past Surgical History:   Procedure Laterality Date   •  SECTION     •  SECTION N/A 2018    Procedure:  SECTION REPEAT;  Surgeon: Ashu Smith MD;  Location: [facility] LABOR DELIVERY;  Service: Obstetrics/Gynecology   • WISDOM TOOTH EXTRACTION           Current Outpatient Medications:   •  ipratropium (ATROVENT) 0.02 % nebulizer solution, INHALE ONE VIAL IN NEBULIZER Q 4 H IF NEEDED, Disp: , Rfl: 3  •  ipratropium-albuterol (DUO-NEB) 0.5-2.5 mg/mL nebulizer, USE 1 VIAL IN NEBULIZER Q 6 H PRN, Disp: , Rfl: 2  •  levothyroxine (SYNTHROID, LEVOTHROID) 125 MCG tablet, Take 125 mcg by mouth Daily., Disp: , Rfl:   •   Prenatal Vit-Fe Fumarate-FA (PRENATAL VITAMIN) 27-0.8 MG tablet, Take 1 tablet by mouth Daily., Disp: 30 tablet, Rfl: 11  •  VENTOLIN  (90 Base) MCG/ACT inhaler, INL 1 TO 2 PFS PO Q 4 TO 6 H PRN, Disp: , Rfl: 1    Allergies   Allergen Reactions   • Eggs Or Egg-Derived Products GI Intolerance   • Ibuprofen GI Intolerance       Social History     Socioeconomic History   • Marital status:      Spouse name: Not on file   • Number of children: Not on file   • Years of education: Not on file   • Highest education level: Not on file   Tobacco Use   • Smoking status: Current Every Day Smoker     Packs/day: 1.00     Years: 16.00     Pack years: 16.00     Types: Cigarettes   • Smokeless tobacco: Never Used   Substance and Sexual Activity   • Alcohol use: No   • Drug use: Yes     Types: Marijuana   • Sexual activity: Yes     Partners: Male       Family History   Problem Relation Age of Onset   • Diabetes Father    • Heart disease Father    • COPD Father    • Diabetes Mother    • Breast cancer Mother    • No Known Problems Son    • No Known Problems Daughter    • Lung cancer Maternal Grandmother    • Lung cancer Maternal Grandfather    • No Known Problems Son        REVIEW OF SYSTEMS:     Review of Systems   Constitutional: Negative.  Negative for fever.   HENT: Negative.    Eyes: Negative.    Respiratory: Negative.    Cardiovascular: Negative.    Gastrointestinal: Negative.  Negative for abdominal pain.   Endocrine: Negative.    Genitourinary: Positive for menstrual problem.   Musculoskeletal: Negative.    Skin: Negative.    Allergic/Immunologic: Negative.    Neurological: Negative.  Negative for vertigo.   Hematological: Negative.    Psychiatric/Behavioral: Negative.        Physical Exam     Physical Exam  Vitals signs and nursing note reviewed.   Constitutional:       Appearance: She is well-developed.   HENT:      Head: Normocephalic and atraumatic.   Neck:      Musculoskeletal: Normal range of motion.  "  Cardiovascular:      Rate and Rhythm: Normal rate.   Pulmonary:      Effort: Pulmonary effort is normal.   Abdominal:      General: There is no distension.      Palpations: Abdomen is soft. There is no mass.      Tenderness: There is no abdominal tenderness. There is no guarding.      Comments: +FHTs on exam   Genitourinary:     Vagina: No vaginal discharge.   Musculoskeletal: Normal range of motion.         General: No tenderness or deformity.   Skin:     General: Skin is warm and dry.      Coloration: Skin is not pale.      Findings: No erythema or rash.   Neurological:      Mental Status: She is alert and oriented to person, place, and time.   Psychiatric:         Behavior: Behavior normal.         Thought Content: Thought content normal.         Judgment: Judgment normal.             Objective    /80   Ht 177.8 cm (70\")   Wt (!) 139 kg (306 lb 6.4 oz)   LMP 2020 (Exact Date)   Breastfeeding No   BMI 43.96 kg/m²     Sarita MACKEY  reports that she has been smoking cigarettes. She has a 16.00 pack-year smoking history. She has never used smokeless tobacco.. I have educated her on the risk of diseases from using tobacco products such as cancer, COPD and heart disease.     I advised her to quit and she is not willing to quit.                 Assessment    1) Pregnancy at Unknown   Diagnoses and all orders for this visit:    1. Screening for condition (Primary)  -     POC Pregnancy, Urine    2. History of  delivery- went into labor at 36 weeks in last pregnancy    3. Hx of preeclampsia, prior pregnancy, currently pregnant    4. Smoker    5. Subclinical iodine-deficiency hypothyroidism    6. Moderate persistent asthma without complication    7. Missed menses         Plan    Patient is on Prenatal vitamins  Problem list reviewed and updated.  Reviewed routine prenatal care with the patient  Zika (travel restrictions/ok to use insect repellant), not to changing cat litter, food " restrictions, avoidance of alcohol, tobacco and drugs and saunas/hot tubs.   All questions answered.     Counseling was given to patient for the following topics: diagnostic results, instructions for management, risk factor reductions, prognosis, patient and family education, impressions, risks and benefits of treatment options and importance of treatment compliance . Total time of the encounter was 45 minutes face to face and 44 minutes was spent counseling.      RTO No follow-ups on file.    Leonel Seth MD    11/23/2020  10:07 EST

## 2020-12-08 ENCOUNTER — INITIAL PRENATAL (OUTPATIENT)
Dept: OBSTETRICS AND GYNECOLOGY | Facility: CLINIC | Age: 35
End: 2020-12-08

## 2020-12-08 VITALS — DIASTOLIC BLOOD PRESSURE: 84 MMHG | WEIGHT: 293 LBS | SYSTOLIC BLOOD PRESSURE: 122 MMHG | BODY MASS INDEX: 44.34 KG/M2

## 2020-12-08 DIAGNOSIS — Z87.59 HISTORY OF PRE-ECLAMPSIA: ICD-10-CM

## 2020-12-08 DIAGNOSIS — E03.9 HYPOTHYROIDISM AFFECTING PREGNANCY, ANTEPARTUM: ICD-10-CM

## 2020-12-08 DIAGNOSIS — Z34.92 PRENATAL CARE IN SECOND TRIMESTER: Primary | ICD-10-CM

## 2020-12-08 DIAGNOSIS — R82.5 POSITIVE URINE DRUG SCREEN: ICD-10-CM

## 2020-12-08 DIAGNOSIS — Z36.9 ENCOUNTER FOR ANTENATAL SCREENING, UNSPECIFIED: ICD-10-CM

## 2020-12-08 DIAGNOSIS — O99.280 HYPOTHYROIDISM AFFECTING PREGNANCY, ANTEPARTUM: ICD-10-CM

## 2020-12-08 LAB
AFP INTERP SERPL-IMP: NORMAL
ALBUMIN SERPL-MCNC: 3.7 G/DL (ref 3.5–5.2)
ALBUMIN/GLOB SERPL: 1.3 G/DL
ALP SERPL-CCNC: 62 U/L (ref 39–117)
ALT SERPL-CCNC: 8 U/L (ref 1–33)
AST SERPL-CCNC: 10 U/L (ref 1–32)
BILIRUB SERPL-MCNC: 0.2 MG/DL (ref 0–1.2)
BUN SERPL-MCNC: 6 MG/DL (ref 6–20)
BUN/CREAT SERPL: 11.3 (ref 7–25)
CALCIUM SERPL-MCNC: 8.8 MG/DL (ref 8.6–10.5)
CHLORIDE SERPL-SCNC: 102 MMOL/L (ref 98–107)
CO2 SERPL-SCNC: 26.6 MMOL/L (ref 22–29)
CREAT SERPL-MCNC: 0.53 MG/DL (ref 0.57–1)
EXTERNAL CYSTIC FIBROSIS: NORMAL
EXTERNAL NIPT: NORMAL
GLOBULIN SER CALC-MCNC: 2.8 GM/DL
GLUCOSE SERPL-MCNC: 81 MG/DL (ref 65–99)
GLUCOSE UR STRIP-MCNC: NEGATIVE MG/DL
POTASSIUM SERPL-SCNC: 4.4 MMOL/L (ref 3.5–5.2)
PROT SERPL-MCNC: 6.5 G/DL (ref 6–8.5)
PROT UR STRIP-MCNC: NEGATIVE MG/DL
SODIUM SERPL-SCNC: 138 MMOL/L (ref 136–145)

## 2020-12-08 PROCEDURE — 0501F PRENATAL FLOW SHEET: CPT | Performed by: NURSE PRACTITIONER

## 2020-12-08 RX ORDER — LEVOTHYROXINE SODIUM 0.12 MG/1
125 TABLET ORAL DAILY
Qty: 30 TABLET | Refills: 2 | Status: SHIPPED | OUTPATIENT
Start: 2020-12-08 | End: 2021-03-16

## 2020-12-08 RX ORDER — ASPIRIN 81 MG/1
81 TABLET ORAL DAILY
Qty: 30 TABLET | Refills: 5 | Status: SHIPPED | OUTPATIENT
Start: 2020-12-08 | End: 2020-12-21

## 2020-12-08 NOTE — PROGRESS NOTES
OB follow up > 20 weeks    Chief Complaint   Patient presents with   • Initial Prenatal Visit       Sarita MACKEY is a 35 y.o.  20w5d being seen today for her obstetrical visit.  Patient reports no complaints. She is a transfer of care from [facility]. Her pregnancy is complicated by h/o  x 4, h/o preeclampsia x3, h/o  delivery, hypothyroidism, smoker, + UDS, abnormal syphilis antibody, and increased BMI. She has an egg allergy and can not get the flu vaccine. She is not currently taking her synthroid because she reports a rx for the new medication is not at the pharmacy. She is not taking a baby ASA or completed a 24 hr urine as rec.  Taking prenatal vitamins: Yes      Review of Systems  Constitutional: neg fatigue  Cardiovascular: neg edema  Gastrointestinal: neg n/v  Genitourinary: Negative for contractions, cramping, vaginal bleeding, or SROM.   Fetal movement: normal    /84   Wt (!) 140 kg (309 lb)   LMP 2020 (Exact Date)   BMI 44.34 kg/m²     FHT: present BPM   Uterine Size: obese abd       Assessment    1) pregnancy at 20w5d-  US IMP: Incomplete anatomy US, cranium and heart not visualized well.     2) AMA- Declines Quad, NIPS, and AFP.  The patient has been counseled regarding advanced maternal age in pregnancy. Disc that increased maternal age in pregnancy increases the risk for pregnancy complications such as gestational hypertension or gestational diabetes. Disc that pregnancy after the age of 35 also increases the risk for having a baby with a missing, damaged or extra chromosomes. Also disc that the risk of  labor,  birth and still birth are also higher.     3)  x 4- Plan repeat  @ 39 weeks. Has had 3 vertical skin incisions. Op note from 2018 report low transverse incision on uterus.     4) H/O Preeclampsia- With the first 3 deliveries and GHTN with last pregnancy. She has not completed baseline 24 hr urine. She is not taking a  baby ASA.     5) Smoker- Approx 1/2 ppd. She was smoking 2ppd. During this visit, approx 3-5 minutes counseling the patient regarding smoking cessation. PT COUNSELED TO QUIT SMOKING IN PREGNANCY.  She has been informed that cigarette smoking is associated with increased incidence of  birth, growth restriction, SIDS, et. Disc that smoking cessation is the single most important thing she can do to improve the pregnancy outcome.  She verbalized understanding to this discussion.      6) Increased BMI- TWG is 14#. Will need growth US.     7) Hypothroidism- She is managed by Rosa Ely her PCP. She currently is on no medication. She was supposed to be taking Synthroid 125mcg. ERX sent. Her TSH was 7.2 and Free T4 is 0.72 in 10/20. She is not current with endocrinology.  Check Thyroid panel and TPO today.     8) H/O PTD- @ 36 weeks with 3rd pregnancy. Following delivery was at term. Reports she did not use Reshma with last pregnancy. CL 4.12cm.     9) Asthma- She uses her inhaler daily. Her RX for her inhaler is good. Managed by PCP.     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     11) + UDS- +THC. Check UDS today.     12) Flu vaccine- Declines flu vaccine d/t egg allergy    13) COVID19- COVID19 precautions were reviewed with the patient. Continue to encourage social distancing, wearing a mask, and good hand hygiene. She does not work outside of the home.     14) H/O anxiety and bipolar- No current meds. Managing as best as possible. She is not in counseling. Disc use of Buspar if needed.         Plan    Continue prenatal vitamins  Reviewed this stage of pregnancy  Problem list updated   Follow up in 2 weeks for OB tummy and repeat anatomy US     LISBETH Miller  2020  10:46 EST

## 2020-12-09 DIAGNOSIS — E66.01 MORBIDLY OBESE (HCC): ICD-10-CM

## 2020-12-09 DIAGNOSIS — O99.282 HYPOTHYROIDISM AFFECTING PREGNANCY IN SECOND TRIMESTER: Primary | ICD-10-CM

## 2020-12-09 DIAGNOSIS — E03.9 HYPOTHYROIDISM AFFECTING PREGNANCY IN SECOND TRIMESTER: Primary | ICD-10-CM

## 2020-12-09 LAB
AMPHETAMINES UR QL SCN: NEGATIVE NG/ML
BARBITURATES UR QL SCN: NEGATIVE NG/ML
BENZODIAZ UR QL SCN: NEGATIVE NG/ML
BZE UR QL SCN: NEGATIVE NG/ML
CANNABINOIDS UR QL SCN: NEGATIVE NG/ML
CREAT UR-MCNC: 173.1 MG/DL (ref 20–300)
LABORATORY COMMENT REPORT: NORMAL
METHADONE UR QL SCN: NEGATIVE NG/ML
OPIATES UR QL SCN: NEGATIVE NG/ML
OXYCODONE+OXYMORPHONE UR QL SCN: NEGATIVE NG/ML
PCP UR QL: NEGATIVE NG/ML
PH UR: 6.1 [PH] (ref 4.5–8.9)
PROPOXYPH UR QL SCN: NEGATIVE NG/ML
RPR SER QL: NON REACTIVE
T3 SERPL-MCNC: 199 NG/DL (ref 80–200)
T4 FREE SERPL-MCNC: 0.75 NG/DL (ref 0.93–1.7)
THYROPEROXIDASE AB SERPL-ACNC: 177 IU/ML (ref 0–34)
TSH SERPL DL<=0.005 MIU/L-ACNC: 10.9 UIU/ML (ref 0.27–4.2)

## 2020-12-21 ENCOUNTER — ROUTINE PRENATAL (OUTPATIENT)
Dept: OBSTETRICS AND GYNECOLOGY | Facility: CLINIC | Age: 35
End: 2020-12-21

## 2020-12-21 VITALS — BODY MASS INDEX: 44.68 KG/M2 | SYSTOLIC BLOOD PRESSURE: 118 MMHG | WEIGHT: 293 LBS | DIASTOLIC BLOOD PRESSURE: 84 MMHG

## 2020-12-21 DIAGNOSIS — E66.01 MORBIDLY OBESE (HCC): Primary | ICD-10-CM

## 2020-12-21 DIAGNOSIS — Z22.330 GBS CARRIER: ICD-10-CM

## 2020-12-21 DIAGNOSIS — E02 SUBCLINICAL IODINE-DEFICIENCY HYPOTHYROIDISM: ICD-10-CM

## 2020-12-21 DIAGNOSIS — J45.40 MODERATE PERSISTENT ASTHMA WITHOUT COMPLICATION: ICD-10-CM

## 2020-12-21 DIAGNOSIS — Z87.51 HISTORY OF PRETERM DELIVERY: ICD-10-CM

## 2020-12-21 DIAGNOSIS — F17.200 SMOKER: ICD-10-CM

## 2020-12-21 DIAGNOSIS — Z98.891 PREVIOUS CESAREAN SECTION: ICD-10-CM

## 2020-12-21 DIAGNOSIS — O09.299 HX OF PREECLAMPSIA, PRIOR PREGNANCY, CURRENTLY PREGNANT: ICD-10-CM

## 2020-12-21 LAB
GLUCOSE UR STRIP-MCNC: NEGATIVE MG/DL
PROT UR STRIP-MCNC: NEGATIVE MG/DL

## 2020-12-21 PROCEDURE — 0502F SUBSEQUENT PRENATAL CARE: CPT | Performed by: OBSTETRICS & GYNECOLOGY

## 2020-12-21 RX ORDER — ASPIRIN 81 MG/1
81 TABLET ORAL DAILY
COMMUNITY
End: 2020-12-21

## 2020-12-21 RX ORDER — ASPIRIN 81 MG/1
81 TABLET ORAL DAILY
Qty: 100 TABLET | Refills: 0 | Status: SHIPPED | OUTPATIENT
Start: 2020-12-21 | End: 2021-04-21 | Stop reason: HOSPADM

## 2020-12-21 RX ORDER — ALBUTEROL SULFATE 90 UG/1
AEROSOL, METERED RESPIRATORY (INHALATION)
COMMUNITY
Start: 2020-12-18 | End: 2021-01-15 | Stop reason: SDUPTHER

## 2020-12-21 NOTE — PROGRESS NOTES
CC: Pt here for ob office visit and anatomy.    HPI: Sarita MACKEY is a 35 y.o.  22w1d being seen today for her obstetrical visit.   Patient reports backache .   Fetal movement: normal. .        ROS: Pt denies visual changes, headaches, shortness of breath, chest pain, esophageal reflux, gastric pain,   nausea and vomiting, diarrhea, rashes, vaginal bleeding, edema, hip pain, pelvic pressure.     SMOKER? Yes  Sarita MACKEY  reports that she has been smoking cigarettes. She has a 16.00 pack-year smoking history. She has never used smokeless tobacco.. I have educated her on the risk of diseases from using tobacco products such as cancer, COPD and heart disease.                ALCOHOL? No  ILLICIT DRUGS? No    O:  /84   Wt (!) 141 kg (311 lb 6.4 oz)   LMP 2020 (Exact Date)   BMI 44.68 kg/m² , additional findings in addition to above flow sheet?: u/s:  Posterior low lying placenta.    Data Review:  UA, flow sheet,  TESTING: u/s: see report.    Lab Results (last 24 hours)     Procedure Component Value Units Date/Time    POC Urinalysis Dipstick [[id number]] Resulted: 20 1145    Specimen: Urine Updated: 20 1146     Glucose, UA Negative     Protein, POC Negative          A:    DIAGNOSES:  35 y.o.  22w1d  Patient Active Problem List   Diagnosis   • Mild tetrahydrocannabinol (THC) abuse, counseled AKR 18   • Hypothyroidism- on Levothyroixine 100 mcg, check TSH each trimester   • Smoker   • Previous  section x 4- plan RLTCS at 39 weeks   • History of  delivery- went into labor at 36 weeks in last pregnancy   • Hx of preeclampsia, prior pregnancy, currently pregnant   • GBS carrier   • Moderate persistent asthma without complication   • Morbidly obese (CMS/HCC)     Diagnoses and all orders for this visit:    1. Morbidly obese (CMS/HCC) (Primary)    2. Subclinical iodine-deficiency hypothyroidism    3. Smoker    4. Previous  section x 4- plan  RLTCS at 39 weeks    5. History of  delivery- went into labor at 36 weeks in last pregnancy    6. GBS carrier    7. Hx of preeclampsia, prior pregnancy, currently pregnant    8. Moderate persistent asthma without complication      Pregnancy Assessment : High Risk Pregnancy previous C/S x 4.  Obesity, smoker.  NEW PROBLEMS? Low lying placenta    P:  Tests ordered for this or next visit: LABS: prenatal profile and ULTRASOUND FOR anatomy, growth  New Meds:Yes. Details: baby ASA  Return in about 4 weeks (around 2021) for ob tummy.    Leonel Seth MD

## 2021-01-15 RX ORDER — ALBUTEROL SULFATE 90 UG/1
1-2 AEROSOL, METERED RESPIRATORY (INHALATION) EVERY 6 HOURS PRN
Qty: 18 G | Refills: 1 | Status: SHIPPED | OUTPATIENT
Start: 2021-01-15 | End: 2021-02-15

## 2021-01-20 ENCOUNTER — ROUTINE PRENATAL (OUTPATIENT)
Dept: OBSTETRICS AND GYNECOLOGY | Facility: CLINIC | Age: 36
End: 2021-01-20

## 2021-01-20 VITALS — WEIGHT: 293 LBS | DIASTOLIC BLOOD PRESSURE: 82 MMHG | SYSTOLIC BLOOD PRESSURE: 128 MMHG | BODY MASS INDEX: 45.83 KG/M2

## 2021-01-20 DIAGNOSIS — F17.200 SMOKER: ICD-10-CM

## 2021-01-20 DIAGNOSIS — Z98.891 PREVIOUS CESAREAN SECTION: ICD-10-CM

## 2021-01-20 DIAGNOSIS — J45.40 MODERATE PERSISTENT ASTHMA WITHOUT COMPLICATION: ICD-10-CM

## 2021-01-20 DIAGNOSIS — R94.6 ABNORMAL THYROID FUNCTION TEST: Primary | ICD-10-CM

## 2021-01-20 DIAGNOSIS — R82.5 POSITIVE URINE DRUG SCREEN: ICD-10-CM

## 2021-01-20 DIAGNOSIS — E66.01 MORBIDLY OBESE (HCC): ICD-10-CM

## 2021-01-20 DIAGNOSIS — R76.8 BIOLOGICAL FALSE POSITIVE RPR TEST: ICD-10-CM

## 2021-01-20 DIAGNOSIS — Z87.51 HISTORY OF PRETERM DELIVERY: ICD-10-CM

## 2021-01-20 DIAGNOSIS — Z23 NEED FOR TDAP VACCINATION: ICD-10-CM

## 2021-01-20 DIAGNOSIS — E02 SUBCLINICAL IODINE-DEFICIENCY HYPOTHYROIDISM: ICD-10-CM

## 2021-01-20 DIAGNOSIS — O09.299 HX OF PREECLAMPSIA, PRIOR PREGNANCY, CURRENTLY PREGNANT: ICD-10-CM

## 2021-01-20 DIAGNOSIS — Z3A.26 26 WEEKS GESTATION OF PREGNANCY: ICD-10-CM

## 2021-01-20 PROBLEM — F12.10 MILD TETRAHYDROCANNABINOL (THC) ABUSE: Status: RESOLVED | Noted: 2018-04-18 | Resolved: 2021-01-20

## 2021-01-20 PROBLEM — Z22.330 GBS CARRIER: Status: RESOLVED | Noted: 2018-10-23 | Resolved: 2021-01-20

## 2021-01-20 PROCEDURE — 90715 TDAP VACCINE 7 YRS/> IM: CPT | Performed by: OBSTETRICS & GYNECOLOGY

## 2021-01-20 PROCEDURE — 90471 IMMUNIZATION ADMIN: CPT | Performed by: OBSTETRICS & GYNECOLOGY

## 2021-01-20 PROCEDURE — 0502F SUBSEQUENT PRENATAL CARE: CPT | Performed by: OBSTETRICS & GYNECOLOGY

## 2021-01-20 NOTE — PROGRESS NOTES
"OB follow up     Sarita MACKEY is a 35 y.o.  26w3d being seen today for her obstetrical visit.  Patient reports no complaints. Fetal movement: normal. She is taking her thyroid meds but never heard back about endocrine appointment. She is not working and is staying home due to COVID. She plans an IUD postpartum and is not ready for a BTO. She is on ASA 81 mg daily but has not turned in her 24 hour urine \"because I don't see the point\". We had a long discussion that the purpose of the 24 hour urine is to learn what her kidney function is like when she does not have preeclampsia. She is smoking 1/2 PPD, down from 2 PPD and we made a goal of 1/4 PPD by her next appt. This is the first time I am seeing this patient in this pregnancy and all of her problems are new to me, the examiner.       Review of Systems  No bleeding, No cramping/contractions     LMP 2020 (Exact Date)     FHT: 150 BPM   Uterine Size: N/A       Assessment/Plan:    1) 35 y.o.  -pregnancy at 26w3d- US today shows growth 49%, TERESA 17.5 cm, CL 4.58 cm. Low lying placenta resolved, posterior and fundal. US compared to last scan on 2020    2) Low lying placenta -resolved.      3) AMA- Declines Quad, NIPS, and AFP.  The patient has been counseled regarding advanced maternal age in pregnancy. Disc that increased maternal age in pregnancy increases the risk for pregnancy complications such as gestational hypertension or gestational diabetes. Disc that pregnancy after the age of 35 also increases the risk for having a baby with a missing, damaged or extra chromosomes. Also disc that the risk of  labor,  birth and still birth are also higher.      4)  x 4- Plan repeat  @ 39 weeks. Has had 3 vertical skin incisions. Op note from 2018 report low transverse incision on uterus. Pt declines BTO, plans IUD. Posterior, fundal placenta.      5) H/O Preeclampsia- With the first 3 deliveries and GHTN with last " pregnancy. She has not completed baseline 24 hr urine and we discussed again at length the need to have a baseline. She remains unconvinced that this is important.  She is on a baby ASA daily .      6) Smoker- Approx 1/2 ppd. She was smoking 2ppd. During this visit, approx 3-5 minutes counseling the patient regarding smoking cessation. PT COUNSELED TO QUIT SMOKING IN PREGNANCY.  She has been informed that cigarette smoking is associated with increased incidence of  birth, growth restriction, SIDS, et. Disc that smoking cessation is the single most important thing she can do to improve the pregnancy outcome.  She verbalized understanding to this discussion.  We made a goal of 1/4 PPD by her next appt.      7) Increased BMI- growth US today 49%, repeat in 4 weeks. BiDN1l=6.1     8) Hypothroidism- She is managed by Rosa Ely her PCP. She currently is on Synthroid 125mcg and recent labs were abnormal. Refer endocrinology again as she did not hear back last time. Check thyroid panel next visit     9) H/O PTD- @ 36 weeks with 3rd pregnancy. Following delivery was at term. Reports she did not use Reshma with last pregnancy. CL 4.58 cm today     10) Asthma- She uses her inhaler daily, usually QD but occ BID. We discussed symptoms that would require a daily inhaler. Her RX for her inhaler is good. Managed by PCP.      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      12) + UDS- +THC. Repeat UDS in 2020 was negative    13)) Flu vaccine- Declines flu vaccine d/t egg allergy     14)Patient advised to have the Tdap shot in the later part of pregnancy to help protect against whooping cough.  Also advised that FOB and other adults who come in contact with the infant should also be vaccinated with Tdap.         15)COVID19- COVID19 precautions were reviewed with the patient. Continue to encourage social distancing, wearing a mask, and good hand hygiene. She  does not work outside of the home.      16) H/O anxiety and bipolar- No current meds. Managing as best as possible. She is not in counseling. Disc use of Buspar if needed.       17)Patient advised to have the Tdap shot in the later part of pregnancy to help protect against whooping cough.  Also advised that FOB and other adults who come in contact with the infant should also be vaccinated with Tdap.  TDAP given today    18)Reviewed this stage of pregnancy    19)Problem list updated     20) RTO 2 weeks OBT, 2 hour GTT, RH+  And thyroid panel and TPO      Rosa Marya De Guzman MD    1/20/2021  11:28 EST

## 2021-02-08 ENCOUNTER — ROUTINE PRENATAL (OUTPATIENT)
Dept: OBSTETRICS AND GYNECOLOGY | Facility: CLINIC | Age: 36
End: 2021-02-08

## 2021-02-08 VITALS — DIASTOLIC BLOOD PRESSURE: 82 MMHG | SYSTOLIC BLOOD PRESSURE: 122 MMHG | BODY MASS INDEX: 46.63 KG/M2 | WEIGHT: 293 LBS

## 2021-02-08 DIAGNOSIS — Z98.891 PREVIOUS CESAREAN SECTION: ICD-10-CM

## 2021-02-08 DIAGNOSIS — E66.01 MORBIDLY OBESE (HCC): ICD-10-CM

## 2021-02-08 DIAGNOSIS — J45.40 MODERATE PERSISTENT ASTHMA WITHOUT COMPLICATION: ICD-10-CM

## 2021-02-08 DIAGNOSIS — Z13.1 ENCOUNTER FOR SCREENING FOR DIABETES MELLITUS: ICD-10-CM

## 2021-02-08 DIAGNOSIS — Z87.51 HISTORY OF PRETERM DELIVERY: ICD-10-CM

## 2021-02-08 DIAGNOSIS — F17.200 SMOKER: ICD-10-CM

## 2021-02-08 DIAGNOSIS — Z36.9 ENCOUNTER FOR ANTENATAL SCREENING, UNSPECIFIED: Primary | ICD-10-CM

## 2021-02-08 LAB
GLUCOSE UR STRIP-MCNC: NEGATIVE MG/DL
PROT UR STRIP-MCNC: NEGATIVE MG/DL

## 2021-02-08 PROCEDURE — 0502F SUBSEQUENT PRENATAL CARE: CPT | Performed by: OBSTETRICS & GYNECOLOGY

## 2021-02-08 NOTE — PROGRESS NOTES
CC: Pt here for ob office visit, GTT, HH. Needs flu shot. Pt ate this morning, so can't complete GTT.     HPI: Sarita MACKEY is a 35 y.o.  29w1d being seen today for her obstetrical visit.   Patient reports no complaints .   Fetal movement: normal.     Pt can't take flu shot due to egg allergy.       ROS: Pt denies visual changes, headaches, shortness of breath, chest pain, esophageal reflux, gastric pain,   nausea and vomiting, diarrhea, rashes, vaginal bleeding, edema, hip pain, pelvic pressure.     SMOKER? Yes  Sarita MACKEY  reports that she has been smoking cigarettes. She has a 16.00 pack-year smoking history. She has never used smokeless tobacco.. I have educated her on the risk of diseases from using tobacco products such as cancer, COPD and heart disease. I advised her to quit and she is not willing to quit.PT COUNSELED TO QUIT SMOKING IN PREGNANCY.  (Cigarette smoking is associated with increased incidence of IUGR, PROM, PTL, PTD, SIDS, asthma, and ear infections.  . Smoking cessation is the single most important thing she can do to improve the pregnancy outcome.)       O:  /82   Wt (!) 147 kg (325 lb)   LMP 2020 (Exact Date)   BMI 46.63 kg/m² , additional findings in addition to above flow sheet?: none    Data Review:  UA, flow sheet,  TESTING: not yet  Lab Results (last 24 hours)     Procedure Component Value Units Date/Time    POC Urinalysis Dipstick [[id number]] Resulted: 21    Specimen: Urine Updated: 21     Glucose, UA Negative     Protein, POC Negative          A:    DIAGNOSES:  35 y.o.  29w1d    Diagnoses and all orders for this visit:    1. Encounter for  screening, unspecified (Primary)  -     Gestational GTT 2 Hr ([facility])  -     Hemoglobin & Hematocrit, Blood  -     CBC & Differential    2. Encounter for screening for diabetes mellitus   -     Gestational GTT 2 Hr ([facility])    3. Smoker    4. Previous  section x  4- plan RLTCS at 39 weeks    5. History of  delivery- went into labor at 36 weeks in  3rd pregnancy and then had term pregnancy    6. Morbidly obese (CMS/HCC)    7. Moderate persistent asthma without complication      Discussion:  1) 35 y.o.  -pregnancy at 29+wks     2) Low lying placenta -resolved.      3) AMA- Declines Quad, NIPS, and AFP.  The patient has been counseled regarding advanced maternal age in pregnancy. Disc that increased maternal age in pregnancy increases the risk for pregnancy complications such as gestational hypertension or gestational diabetes. Disc that pregnancy after the age of 35 also increases the risk for having a baby with a missing, damaged or extra chromosomes. Also disc that the risk of  labor,  birth and still birth are also higher.      4)  x 4- Plan repeat  @ 39 weeks. Has had 3 vertical skin incisions. Op note from 2018 report low transverse incision on uterus. Pt declines BTO, plans IUD. Posterior, fundal placenta.      5) H/O Preeclampsia- With the first 3 deliveries and GHTN with last pregnancy. She still has not completed baseline 24 hr urine and we discussed again at length the need to have a baseline. she says she will try again.   She is on a baby ASA daily .      6) Smoker- Approx 1/2 ppd. See above.     7) Increased BMI- growth US: repeat in 4 weeks. HgGE7y=8.1     8) Hypothroidism- She is managed by Rosa Ely her PCP. She currently is on Synthroid 125mcg and recent labs were abnormal. Refer endocrinology again as she did not hear back last time. Check thyroid panel next visit. Pt declined.     9) H/O PTD- @ 36 weeks with 3rd pregnancy. Following delivery was at term. Reports she did not use Reshma with last pregnancy.      10) Asthma- She uses her inhaler daily, usually QD but occ BID. We discussed symptoms that would require a daily inhaler. Her RX for her inhaler is good. Managed by PCP.      11) Equivocal syphilis  AB- Had an equivocal antibody screen but the RPR with confirmatory test was normal.      12) hx + UDS- +THC.      13)) Flu vaccine- Declines flu vaccine d/t egg allergy     14)Patient advised to have the Tdap shot in the later part of pregnancy to help protect against whooping cough.  Also advised that FOB and other adults who come in contact with the infant should also be vaccinated with Tdap. Received today.           15)COVID19- COVID19 precautions were reviewed with the patient. Continue to encourage social distancing, wearing a mask, and good hand hygiene. She does not work outside of the home.      16) H/O anxiety and bipolar- No current meds. Managing as best as possible. She is not in counseling. Disc use of Buspar if needed.     17)Reviewed this stage of pregnancy     18)Problem list updated      20) RTO 2 weeks OBT.  ASAP:  2 hour GTT, RH+  And thyroid panel and TPO    P:  Tests ordered for this or next visit: GTT/H&H and ULTRASOUND FOR growth  New Meds:No  Return in about 2 weeks (around 2/22/2021) for ob tummy.    Leonel Seth MD

## 2021-02-15 RX ORDER — ALBUTEROL SULFATE 90 UG/1
AEROSOL, METERED RESPIRATORY (INHALATION)
Qty: 18 G | Refills: 1 | Status: SHIPPED | OUTPATIENT
Start: 2021-02-15 | End: 2021-03-09

## 2021-02-22 ENCOUNTER — ROUTINE PRENATAL (OUTPATIENT)
Dept: OBSTETRICS AND GYNECOLOGY | Facility: CLINIC | Age: 36
End: 2021-02-22

## 2021-02-22 VITALS — DIASTOLIC BLOOD PRESSURE: 76 MMHG | SYSTOLIC BLOOD PRESSURE: 118 MMHG | BODY MASS INDEX: 46.29 KG/M2 | WEIGHT: 293 LBS

## 2021-02-22 DIAGNOSIS — Z13.1 ENCOUNTER FOR SCREENING FOR DIABETES MELLITUS: Primary | ICD-10-CM

## 2021-02-22 DIAGNOSIS — Z87.51 HISTORY OF PRETERM DELIVERY: ICD-10-CM

## 2021-02-22 DIAGNOSIS — Z98.891 PREVIOUS CESAREAN SECTION: ICD-10-CM

## 2021-02-22 DIAGNOSIS — E66.01 MORBIDLY OBESE (HCC): ICD-10-CM

## 2021-02-22 DIAGNOSIS — O09.299 HX OF PREECLAMPSIA, PRIOR PREGNANCY, CURRENTLY PREGNANT: ICD-10-CM

## 2021-02-22 DIAGNOSIS — R82.5 POSITIVE URINE DRUG SCREEN: ICD-10-CM

## 2021-02-22 DIAGNOSIS — O99.019 MATERNAL ANEMIA IN PREGNANCY, ANTEPARTUM: ICD-10-CM

## 2021-02-22 DIAGNOSIS — Z36.9 ENCOUNTER FOR ANTENATAL SCREENING, UNSPECIFIED: ICD-10-CM

## 2021-02-22 DIAGNOSIS — O28.3 ABNORMAL FETAL ULTRASOUND: ICD-10-CM

## 2021-02-22 DIAGNOSIS — E02 SUBCLINICAL IODINE-DEFICIENCY HYPOTHYROIDISM: ICD-10-CM

## 2021-02-22 DIAGNOSIS — F17.200 SMOKER: ICD-10-CM

## 2021-02-22 DIAGNOSIS — J45.40 MODERATE PERSISTENT ASTHMA WITHOUT COMPLICATION: ICD-10-CM

## 2021-02-22 LAB
BASOPHILS # BLD AUTO: 0.07 10*3/MM3 (ref 0–0.2)
BASOPHILS NFR BLD AUTO: 0.5 % (ref 0–1.5)
EOSINOPHIL # BLD AUTO: 0.13 10*3/MM3 (ref 0–0.4)
EOSINOPHIL NFR BLD AUTO: 1 % (ref 0.3–6.2)
ERYTHROCYTE [DISTWIDTH] IN BLOOD BY AUTOMATED COUNT: 12.1 % (ref 12.3–15.4)
GLUCOSE 1H P 75 G GLC PO SERPL-MCNC: 107 MG/DL (ref 65–179)
GLUCOSE 2H P 75 G GLC PO SERPL-MCNC: 73 MG/DL (ref 65–154)
GLUCOSE P FAST SERPL-MCNC: 83 MG/DL (ref 65–94)
GLUCOSE UR STRIP-MCNC: NEGATIVE MG/DL
HCT VFR BLD AUTO: 39.6 % (ref 34–46.6)
HGB BLD-MCNC: 13.2 G/DL (ref 12–15.9)
IMM GRANULOCYTES # BLD AUTO: 0.07 10*3/MM3 (ref 0–0.05)
IMM GRANULOCYTES NFR BLD AUTO: 0.5 % (ref 0–0.5)
LYMPHOCYTES # BLD AUTO: 2.87 10*3/MM3 (ref 0.7–3.1)
LYMPHOCYTES NFR BLD AUTO: 21.2 % (ref 19.6–45.3)
MCH RBC QN AUTO: 30.6 PG (ref 26.6–33)
MCHC RBC AUTO-ENTMCNC: 33.3 G/DL (ref 31.5–35.7)
MCV RBC AUTO: 91.7 FL (ref 79–97)
MONOCYTES # BLD AUTO: 0.79 10*3/MM3 (ref 0.1–0.9)
MONOCYTES NFR BLD AUTO: 5.8 % (ref 5–12)
NEUTROPHILS # BLD AUTO: 9.61 10*3/MM3 (ref 1.7–7)
NEUTROPHILS NFR BLD AUTO: 71 % (ref 42.7–76)
NRBC BLD AUTO-RTO: 0 /100 WBC (ref 0–0.2)
PLATELET # BLD AUTO: 280 10*3/MM3 (ref 140–450)
PROT UR STRIP-MCNC: NEGATIVE MG/DL
RBC # BLD AUTO: 4.32 10*6/MM3 (ref 3.77–5.28)
WBC # BLD AUTO: 13.54 10*3/MM3 (ref 3.4–10.8)

## 2021-02-22 PROCEDURE — 0502F SUBSEQUENT PRENATAL CARE: CPT | Performed by: OBSTETRICS & GYNECOLOGY

## 2021-02-22 NOTE — PROGRESS NOTES
CC: Pt here for ob office visit and u/s for EFW due to obesity and CL due to hx of ptd.    HPI: Sarita MACKEY is a 35 y.o.  31w1d being seen today for her obstetrical visit.   Patient reports new complaint of skin tags around vagina she would like to have removed at some point.  .   Fetal movement: normal. .        ROS: Pt denies visual changes, headaches, shortness of breath, chest pain, esophageal reflux, gastric pain,   nausea and vomiting, diarrhea, rashes, vaginal bleeding, edema, hip pain, pelvic pressure.     SMOKER? Yes  Sarita MACKEY  reports that she has been smoking cigarettes. She has a 16.00 pack-year smoking history. She has never used smokeless tobacco.. I have educated her on the risk of diseases from using tobacco products such as cancer, COPD and heart disease. PT COUNSELED TO QUIT SMOKING IN PREGNANCY.  (Cigarette smoking is associated with increased incidence of IUGR, PROM, PTL, PTD, SIDS, asthma, and ear infections.  . Smoking cessation is the single most important thing she can do to improve the pregnancy outcome.)     ALCOHOL? No  ILLICIT DRUGS? No    O:  /76   Wt (!) 146 kg (322 lb 9.6 oz)   LMP 2020 (Exact Date)   BMI 46.29 kg/m² , additional findings in addition to above flow sheet?: u/s:  CL= 3.97cms, EFW = 44%, TERESA = 22cms.  Abnormal findings: left fetal renal pelvis is 1.3cms. R is normal    Data Review:  UA, flow sheet,  TESTING: u/s: as above.   Lab Results (last 24 hours)     Procedure Component Value Units Date/Time    POC Urinalysis Dipstick [[id number]] Resulted: 21    Specimen: Urine Updated: 21     Glucose, UA Negative     Protein, POC Negative          A:    DIAGNOSES:  35 y.o.  31w1d  Patient Active Problem List   Diagnosis   • Hypothyroidism- on Levothyroixine 100 mcg, check TSH each trimester   • Smoker   • Previous  section x 4- plan RLTCS at 39 weeks   • History of  delivery- went into labor  at 36 weeks in  3rd pregnancy and then had term pregnancy   • Hx of preeclampsia, prior pregnancy, currently pregnant   • Moderate persistent asthma without complication   • Morbidly obese (CMS/HCC)   • Positive urine drug screen- + THC, repeat 2020 was neg   • Biological false positive RPR test- repeat RPR normal   • Abnormal fetal ultrasound: dilated L fetal renal pelvis:  MFM consult:      Diagnoses and all orders for this visit:    1. Encounter for screening for diabetes mellitus  (Primary)  -     Cancel: Gestational GTT 2 Hr ([facility])  -     Cancel: Hemoglobin & Hematocrit, Blood  -     Gestational GTT 2 Hr ([facility])  -     Hemoglobin & Hematocrit, Blood  -     CBC & Differential    2. Encounter for  screening, unspecified  -     Cancel: Gestational GTT 2 Hr ([facility])  -     Cancel: Hemoglobin & Hematocrit, Blood  -     Gestational GTT 2 Hr ([facility])  -     Hemoglobin & Hematocrit, Blood  -     CBC & Differential    3. Maternal anemia in pregnancy, antepartum  -     Cancel: Gestational GTT 2 Hr ([facility])  -     Cancel: Hemoglobin & Hematocrit, Blood  -     Gestational GTT 2 Hr ([facility])  -     Hemoglobin & Hematocrit, Blood  -     CBC & Differential    4. Moderate persistent asthma without complication    5. Smoker    6. Positive urine drug screen- + THC, repeat 2020 was neg    7. Hx of preeclampsia, prior pregnancy, currently pregnant    8. History of  delivery- went into labor at 36 weeks in  3rd pregnancy and then had term pregnancy    9. Previous  section x 4- plan RLTCS at 39 weeks    10. Morbidly obese (CMS/HCC)    11. Subclinical iodine-deficiency hypothyroidism    12. Abnormal fetal ultrasound: dilated L fetal renal pelvis:  MFM consult:   -     Ambulatory Referral to Perinatology      Pregnancy Assessment : High Risk Pregnancy see list above.   NEW PROBLEMS? Abnormal fetal ultrasound.  MFM consult planned.  Perineal skin tags.      P:  Tests ordered for this or next visit:  GTT/H&H and ULTRASOUND FOR as above  New Meds:No  Return in about 2 weeks (around 3/8/2021) for ob tummy.    Leonel Seth MD

## 2021-03-08 ENCOUNTER — ROUTINE PRENATAL (OUTPATIENT)
Dept: OBSTETRICS AND GYNECOLOGY | Facility: CLINIC | Age: 36
End: 2021-03-08

## 2021-03-08 VITALS — WEIGHT: 293 LBS | BODY MASS INDEX: 46.66 KG/M2 | DIASTOLIC BLOOD PRESSURE: 82 MMHG | SYSTOLIC BLOOD PRESSURE: 126 MMHG

## 2021-03-08 DIAGNOSIS — O09.523 MULTIGRAVIDA OF ADVANCED MATERNAL AGE IN THIRD TRIMESTER: ICD-10-CM

## 2021-03-08 DIAGNOSIS — Z87.51 HISTORY OF PRETERM DELIVERY: ICD-10-CM

## 2021-03-08 DIAGNOSIS — E66.01 MORBIDLY OBESE (HCC): ICD-10-CM

## 2021-03-08 DIAGNOSIS — O09.299 HX OF PREECLAMPSIA, PRIOR PREGNANCY, CURRENTLY PREGNANT: ICD-10-CM

## 2021-03-08 DIAGNOSIS — F17.200 SMOKER: ICD-10-CM

## 2021-03-08 DIAGNOSIS — J45.40 MODERATE PERSISTENT ASTHMA WITHOUT COMPLICATION: ICD-10-CM

## 2021-03-08 DIAGNOSIS — Z98.891 PREVIOUS CESAREAN SECTION: ICD-10-CM

## 2021-03-08 DIAGNOSIS — O28.3 ABNORMAL FETAL ULTRASOUND: ICD-10-CM

## 2021-03-08 DIAGNOSIS — R76.8 BIOLOGICAL FALSE POSITIVE RPR TEST: ICD-10-CM

## 2021-03-08 DIAGNOSIS — E03.1 CONGENITAL HYPOTHYROIDISM WITHOUT GOITER: Primary | ICD-10-CM

## 2021-03-08 LAB
GLUCOSE UR STRIP-MCNC: NEGATIVE MG/DL
PROT UR STRIP-MCNC: NEGATIVE MG/DL
T4 FREE SERPL-MCNC: 0.96 NG/DL (ref 0.93–1.7)
TSH SERPL DL<=0.005 MIU/L-ACNC: 6.66 UIU/ML (ref 0.27–4.2)

## 2021-03-08 PROCEDURE — 0502F SUBSEQUENT PRENATAL CARE: CPT | Performed by: OBSTETRICS & GYNECOLOGY

## 2021-03-08 NOTE — PROGRESS NOTES
CC: Pt here for ob office visit.    HPI: Sarita MACKEY is a 35 y.o.  33w1d being seen today for her obstetrical visit. Pt has MFM apt later this week for f/u retal renal anomaly.    Patient reports BH contractions.  new .   Fetal movement: normal. .        ROS: Pt denies visual changes, headaches, shortness of breath, chest pain, esophageal reflux, gastric pain,   nausea and vomiting, diarrhea, rashes, vaginal bleeding, edema, hip pain, pelvic pressure.     SMOKER? Yes  Sarita MACKEY  reports that she has been smoking cigarettes. She has a 16.00 pack-year smoking history. She has never used smokeless tobacco.. I have educated her on the risk of diseases from using tobacco products such as cancer, COPD and heart disease. PT COUNSELED TO QUIT SMOKING IN PREGNANCY.  (Cigarette smoking is associated with increased incidence of IUGR, PROM, PTL, PTD, SIDS, asthma, and ear infections.  . Smoking cessation is the single most important thing she can do to improve the pregnancy outcome.)            ALCOHOL? No  ILLICIT DRUGS? Yes + hx THC, none now    O:  /82   Wt (!) 148 kg (325 lb 3.2 oz)   LMP 2020 (Exact Date)   BMI 46.66 kg/m² , additional findings in addition to above flow sheet?: no    Data Review:  UA, flow sheet,  TESTING: u/s: no  Lab Results (last 24 hours)     Procedure Component Value Units Date/Time    POC Urinalysis Dipstick [[id number]] Resulted: 21 1116    Specimen: Urine Updated: 21 1116     Glucose, UA Negative     Protein, POC Negative          A:    DIAGNOSES:  35 y.o.  33w1d  Patient Active Problem List   Diagnosis   • Hypothyroidism- on Levothyroixine 100 mcg, check TSH each trimester   • Smoker   • Previous  section x 4- plan RLTCS at 39 weeks   • History of  delivery- went into labor at 36 weeks in  3rd pregnancy and then had term pregnancy   • Hx of preeclampsia, prior pregnancy, currently pregnant   • Moderate persistent asthma  without complication   • Morbidly obese (CMS/HCC)   • Positive urine drug screen- + THC, repeat 2020 was neg   • Biological false positive RPR test- repeat RPR normal   • Abnormal fetal ultrasound: dilated L fetal renal pelvis:  MFM consult:    • Multigravida of advanced maternal age in third trimester     Diagnoses and all orders for this visit:    1. Congenital hypothyroidism without goiter (Primary)  -     TSH Rfx On Abnormal To Free T4    2. Morbidly obese (CMS/HCC)    3. Previous  section x 4- plan RLTCS at 39 weeks    4. History of  delivery- went into labor at 36 weeks in  3rd pregnancy and then had term pregnancy    5. Hx of preeclampsia, prior pregnancy, currently pregnant    6. Abnormal fetal ultrasound: dilated L fetal renal pelvis:  MFM consult:     7. Moderate persistent asthma without complication    8. Smoker    9. Biological false positive RPR test- repeat RPR normal    10. Multigravida of advanced maternal age in third trimester      Pregnancy Assessment : High Risk Pregnancy all the above problems were reviewed with pt.    NEW PROBLEMS? Delta malone contractions.      P:  Tests ordered for this or next visit: LABS: TSH, EFW scan next visit.  New Meds:No  Return in about 2 weeks (around 3/22/2021) for ob tummy.    Leonel Seth MD

## 2021-03-09 RX ORDER — ALBUTEROL SULFATE 90 UG/1
AEROSOL, METERED RESPIRATORY (INHALATION)
Qty: 18 G | Refills: 1 | Status: SHIPPED | OUTPATIENT
Start: 2021-03-09 | End: 2021-03-26 | Stop reason: SDUPTHER

## 2021-03-10 ENCOUNTER — HOSPITAL ENCOUNTER (OUTPATIENT)
Dept: ULTRASOUND IMAGING | Facility: HOSPITAL | Age: 36
Discharge: HOME | End: 2021-03-10
Admitting: OBSTETRICS & GYNECOLOGY

## 2021-03-10 VITALS
TEMPERATURE: 98 F | OXYGEN SATURATION: 98 % | DIASTOLIC BLOOD PRESSURE: 77 MMHG | HEART RATE: 92 BPM | HEIGHT: 70 IN | SYSTOLIC BLOOD PRESSURE: 132 MMHG | WEIGHT: 293 LBS | BODY MASS INDEX: 41.95 KG/M2

## 2021-03-10 DIAGNOSIS — O28.3 ABNORMAL FETAL ULTRASOUND: ICD-10-CM

## 2021-03-10 PROCEDURE — 76818 FETAL BIOPHYS PROFILE W/NST: CPT

## 2021-03-10 PROCEDURE — 76811 OB US DETAILED SNGL FETUS: CPT

## 2021-03-10 NOTE — CONSULTS
Ms. 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   She is unaccompanied during today's exam.    This is the first time I am seeing this patient in this pregnancy and all of her problems are new to me.     35 y.o.  at 33w3d per 2021, by Ultrasound     Chief complaint    ICD-10-CM ICD-9-CM   1. Abnormal fetal ultrasound: dilated L fetal renal pelvis:  MFM consult:   O28.3 796.5       PMH  OB History    Para Term  AB Living   7 4 3 1 2 4   SAB TAB Ectopic Molar Multiple Live Births   2 0 0 0 0 4      # Outcome Date GA Lbr Petey/2nd Weight Sex Delivery Anes PTL Lv   7 Current            6 Term 18 39w1d  3155 g (6 lb 15.3 oz) M CS-LTranv Spinal  THERESA      Name: VALDEMAR MONGE      Apgar1: 9  Apgar5: 9   5  12    M CS-LTranv   THERESA   4 Term 09    F CS-LTranv   THERESA   3 Term 04    M CS-LTranv   THERESA   2 SAB            1 SAB                Past Medical History:   Diagnosis Date   • Asthma    • COPD (chronic obstructive pulmonary disease) (CMS/[facility])    • Histoplasmosis    • Hypertension    • Hypothyroid    • Migraine    • Reactive airway disease    • Substance abuse (CMS/[facility])     thc       Allergies   Allergen Reactions   • Eggs Or Egg-Derived Products GI Intolerance   • Ibuprofen GI Intolerance       Past Surgical History:   Procedure Laterality Date   •  SECTION     •  SECTION N/A 2018    Procedure:  SECTION REPEAT;  Surgeon: Ashu Smith MD;  Location: [facility] LABOR DELIVERY;  Service: Obstetrics/Gynecology   • WISDOM TOOTH EXTRACTION             Current Outpatient Medications:   •  albuterol sulfate  (90 Base) MCG/ACT inhaler, INHALE 1 TO 2 PUFFS BY MOUTH EVERY 4 TO 6 HOURS AS NEEDED FOR ASTHMA FOR UP TO 30 DAYS, Disp: 18 g, Rfl: 1  •  aspirin 81 MG EC tablet, Take 1 tablet by mouth Daily., Disp: 100 tablet, Rfl: 0  •   "ipratropium (ATROVENT) 0.02 % nebulizer solution, INHALE ONE VIAL IN NEBULIZER Q 4 H IF NEEDED, Disp: , Rfl: 3  •  ipratropium-albuterol (DUO-NEB) 0.5-2.5 mg/mL nebulizer, USE 1 VIAL IN NEBULIZER Q 6 H PRN, Disp: , Rfl: 2  •  levothyroxine (SYNTHROID, LEVOTHROID) 125 MCG tablet, Take 1 tablet by mouth Daily., Disp: 30 tablet, Rfl: 2  •  Prenatal Vit-Fe Fumarate-FA (PRENATAL VITAMIN) 27-0.8 MG tablet, Take 1 tablet by mouth Daily., Disp: 30 tablet, Rfl: 11      Family history is negative for mental retardation, trisomy 21, congenital heart disease, spina bifida, cystic fibrosis, muscular dystrophy, other birth defects, Buddhism ancestry.   The patient has a family history of: none of the above.    Pt's daughter age 11 has small kidneys and incontinence.  She was previously thought to have a tethered cord.       Social history  Smoking status:    Smokeless tobacco:   Alcohol use:  No  Street drug use:  Employment:  Presently homemaker; previously Phlebotomist at [employer]      VITALS   Vitals:    03/10/21 1410   BP: 132/77   BP Location: Right arm   Patient Position: Sitting   Pulse: 92   Temp: 98 °F (36.7 °C)   TempSrc: Infrared   SpO2: 98%   Weight: (!) 147 kg (325 lb)   Height: 177.8 cm (70\")      Body mass index is 46.63 kg/m².       Labs    Declined cfDNA, cystic fibrosis carrier screen  Declined MSAFP screen      See US report  Incidental views note presence of both maternal kidneys.       Impression:    33w3d per 4/25/2021    Age 35 at HAYES: T21 incidence 1:378.  US exam cannot exclude fetal T21    Fetal Left MCDK   Limited Views of right kidney are normal at this time.     BMI 46.6    Hx preeclampsia \"in every pregnancy\"        Discussion:    Illustrations of MCDK were reviewed with Ms. Nails.  Pending continued absence of cystic dysplasia in right kidney, a healthy childhood can be anticipated, with consideration of care for left kidney.  The infant will require surveillance for hypertension (caused by dysplastic " kidney) and outpatient consult with peds urology within first week of life.        Recommendations:    Weekly BPP at [facility] per BMI associated increased incidence of late third trimester stillbirth.     Inform pediatric care providers of MCDK   renal US after delivery.    If  renal US is not avialable at [facility], suggest delivery at [facility].      will need peds urology consult within 1st week of life; pt knows APRN at peds urology based on care her daughter has received.  She will make an appointment prior to delivery to discuss anticipated  mgmt and also an appointment for  during 1st week of life.     Pt desires medical genetics consultation regarding etiologies of MCDK; daughter with abnormal kidneys.  As per her request I ordered a medical genetics consultation.     BP cuff for daily home self BP monitoring.  Promptly go to L&D if BP > 160/100.     Elective delivery during 39th completed week of gestation pending indications for earlier delivery (eg hypertension).     VTE prophylaxis:  SCDs peripartum, ie when hospitalized and in bed.  If  delivery is required recommend Enoxaparin 40mg twice daily x 6 weeks. ([facility] consult series #51, VTE prophylaxis for  delivery)    Suggest peak flow meter for asthma management    Pneumovax is indicated per cigarette smoking and asthma.      For billing purposes, I devoted approximately 40 minutes to pt care exclusive of ultrasound exam, specifically including: chart review, patient interview & counseling, note writing.

## 2021-03-12 ENCOUNTER — ROUTINE PRENATAL (OUTPATIENT)
Dept: OBSTETRICS AND GYNECOLOGY | Facility: CLINIC | Age: 36
End: 2021-03-12

## 2021-03-12 VITALS — WEIGHT: 293 LBS | SYSTOLIC BLOOD PRESSURE: 126 MMHG | DIASTOLIC BLOOD PRESSURE: 78 MMHG | BODY MASS INDEX: 48.53 KG/M2

## 2021-03-12 DIAGNOSIS — Z98.891 PREVIOUS CESAREAN SECTION: ICD-10-CM

## 2021-03-12 DIAGNOSIS — Z87.51 HISTORY OF PRETERM DELIVERY: ICD-10-CM

## 2021-03-12 DIAGNOSIS — O28.3 ABNORMAL FETAL ULTRASOUND: ICD-10-CM

## 2021-03-12 DIAGNOSIS — Z34.93 PRENATAL CARE IN THIRD TRIMESTER: Primary | ICD-10-CM

## 2021-03-12 DIAGNOSIS — E66.01 MORBIDLY OBESE (HCC): ICD-10-CM

## 2021-03-12 LAB
GLUCOSE UR STRIP-MCNC: NEGATIVE MG/DL
PROT UR STRIP-MCNC: NEGATIVE MG/DL

## 2021-03-12 PROCEDURE — 0502F SUBSEQUENT PRENATAL CARE: CPT | Performed by: NURSE PRACTITIONER

## 2021-03-12 NOTE — PROGRESS NOTES
OB follow up     Sarita MACKEY is a 35 y.o.  33w5d being seen today for her obstetrical visit.  Patient reports no complaints. Fetal movement: normal. She has an upcoming appt next week with endocrinology.         Review of Systems  No bleeding, No cramping/contractions     /78   Wt (!) 153 kg (338 lb 3.2 oz)   LMP 2020 (Exact Date)   BMI 48.53 kg/m²     FHT: 150 BPM   Uterine Size: N/A     MFM consult 3/10/20: Weekly BPP at [facility] per BMI associated increased incidence of late third trimester stillbirth.      Inform pediatric care providers of MCDK   renal US after delivery.    If  renal US is not avialable at [facility], suggest delivery at [facility].       will need peds urology consult within 1st week of life; pt knows APRN at peds urology based on care her daughter has received.  She will make an appointment prior to delivery to discuss anticipated  mgmt and also an appointment for  during 1st week of life.      Pt desires medical genetics consultation regarding etiologies of MCDK; daughter with abnormal kidneys.  As per her request I ordered a medical genetics consultation.      BP cuff for daily home self BP monitoring.  Promptly go to L&D if BP > 160/100.      Elective delivery during 39th completed week of gestation pending indications for earlier delivery (eg hypertension).      VTE prophylaxis:  SCDs peripartum, ie when hospitalized and in bed.  If  delivery is required recommend Enoxaparin 40mg twice daily x 6 weeks. ([facility] consult series #51, VTE prophylaxis for  delivery)     Suggest peak flow meter for asthma management     Pneumovax is indicated per cigarette smoking and asthma.       Assessment/Plan:    1) 35 y.o.  -pregnancy at 33.5 weeks- US IMP: BPP  8/8. TERESA 20cm. (R) renal pelvis 0.3cm. (L) kidney multicystic and dysplastic    2) Low lying placenta -resolved.      3) AMA- Declines Quad, NIPS, and AFP.   The patient has been counseled regarding advanced maternal age in pregnancy. Disc that increased maternal age in pregnancy increases the risk for pregnancy complications such as gestational hypertension or gestational diabetes. Disc that pregnancy after the age of 35 also increases the risk for having a baby with a missing, damaged or extra chromosomes. Also disc that the risk of  labor,  birth and still birth are also higher.      4)  x 4- Plan repeat  @ 39 weeks. Has had 3 vertical skin incisions. Op note from 2018 report low transverse incision on uterus. Pt declines BTO, plans IUD. Posterior, fundal placenta.      5) H/O Preeclampsia- With the first 3 deliveries and GHTN with last pregnancy. She is on a baby ASA daily .      6) Smoker- Approx 1/2 ppd. She was smoking 2ppd. During this visit, approx 3-5 minutes counseling the patient regarding smoking cessation. PT COUNSELED TO QUIT SMOKING IN PREGNANCY.  She has been informed that cigarette smoking is associated with increased incidence of  birth, growth restriction, SIDS, et. Disc that smoking cessation is the single most important thing she can do to improve the pregnancy outcome.  She verbalized understanding to this discussion.  We made a goal of 1/4 PPD by her next appt.      7) Increased BMI-  [id number]=0.1 Passed 2hr GTT. Growth 52% on 3/10/21 with MFM.       8) Hypothroidism-  She currently is on Synthroid 125mcg. Has upcoming appt with Endocrinology next week.      9) H/O PTD- @ 36 weeks with 3rd pregnancy. Following delivery was at term. Reports she did not use [place] with last pregnancy.      10) Asthma- She uses her inhaler daily, usually QD but occ BID. We discussed symptoms that would require a daily inhaler. Her RX for her inhaler is good. Managed by PCP.      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      12) + UDS- +THC. Repeat UDS in 2020 was  negative    13) Flu vaccine- Declines flu vaccine d/t egg allergy     14) S/P Tdap vaccine     15)COVID19- COVID19 precautions were reviewed with the patient. Continue to encourage social distancing, wearing a mask, and good hand hygiene. She does not work outside of the home.      16) H/O anxiety and bipolar- No current meds. Managing as best as possible. She is not in counseling. Disc use of Buspar if needed.      RTO 1 wk NST/BPP     Wenide Dunlap, APRN  3/12/2021  10:53 EST

## 2021-03-16 ENCOUNTER — OFFICE VISIT (OUTPATIENT)
Dept: ENDOCRINOLOGY | Age: 36
End: 2021-03-16

## 2021-03-16 VITALS
WEIGHT: 293 LBS | HEIGHT: 70 IN | DIASTOLIC BLOOD PRESSURE: 88 MMHG | SYSTOLIC BLOOD PRESSURE: 140 MMHG | BODY MASS INDEX: 41.95 KG/M2

## 2021-03-16 DIAGNOSIS — E03.9 ACQUIRED HYPOTHYROIDISM: Primary | ICD-10-CM

## 2021-03-16 DIAGNOSIS — Z3A.34 34 WEEKS GESTATION OF PREGNANCY: ICD-10-CM

## 2021-03-16 PROCEDURE — 99204 OFFICE O/P NEW MOD 45 MIN: CPT | Performed by: INTERNAL MEDICINE

## 2021-03-16 RX ORDER — LEVOTHYROXINE SODIUM 0.15 MG/1
TABLET ORAL
Qty: 30 TABLET | Refills: 11 | Status: SHIPPED | OUTPATIENT
Start: 2021-03-16

## 2021-03-16 RX ORDER — LEVOTHYROXINE SODIUM 175 UG/1
TABLET ORAL
Qty: 30 TABLET | Refills: 11 | Status: SHIPPED | OUTPATIENT
Start: 2021-03-16

## 2021-03-16 NOTE — PROGRESS NOTES
"Chief Complaint  Chief Complaint   Patient presents with   • Hypothyroidism       Subjective          History of Present Illness    Sarita MACKEY 35 y.o. presents as a new patient for the evaluation of Hypothyroidism. Consulted by Dr. Seth    Patient was diagnosed with hypothyroidism during her teenage years, prepregnancy her thyroid dosage was 100 mcg oral daily.  This is her fifth pregnancy.    She had issues with thyroid adjustment during her second in the current pregnancy weight her thyroid levels had to be frequently monitored.    Today in clinic patient reports that she is on levothyroxine 125 mcg oral daily, compliant with the medication and takes it on empty stomach.  She is currently 34 weeks gestation.    Energy levels are okay, has gained weight more in this pregnancy.  No other significant hyper or hypothyroid symptoms.  Does have strong family history of thyroid disease.    Planning a  in this pregnancy on 2021.  Preeclampsia in the prior pregnancies and not in this 1.          Reviewed primary care physician's/consulting physician documentation and lab results         I have reviewed the patient's allergies, medicines, past medical hx, family hx and social hx in detail.    Objective   Vital Signs:   /88   Ht 177.8 cm (70\")   Wt (!) 149 kg (327 lb 12.8 oz)   LMP 2020 (Exact Date)   BMI 47.03 kg/m²   Physical Exam   General appearance - no distress  Eyes- anicteric sclera  Ear nose and throat-external ears and nose normal.    Respiratory-normal chest on inspection.  No respiratory distress noted.  Musculoskeletal-no edema.    Skin-no rashes.  Neuro-alert and oriented x3            Result Review :   The following data was reviewed by: Bereket Garcia MD on 2021:  Routine Prenatal on 2021   Component Date Value Ref Range Status   • Glucose, UA 2021 Negative  Negative, 1000 mg/dL (3+) Final   • Protein, POC 2021 Negative  Negative Final "     Data reviewed: OB/GYN documentation and primary care documentation       Results Review:    I reviewed the patient's new clinical results.     Assessment and Plan    Problem List Items Addressed This Visit        Other    Hypothyroidism- on Levothyroixine 100 mcg, check TSH each trimester - Primary    Overview     Pt's PCP states her levels are wnl         Relevant Medications    levothyroxine (SYNTHROID, LEVOTHROID) 175 MCG tablet    levothyroxine (Synthroid) 150 MCG tablet    Other Relevant Orders    TSH    T4, Free    T3, Free    TSH    T3, Free    T4, Free      Other Visit Diagnoses     34 weeks gestation of pregnancy        Relevant Orders    TSH    T4, Free    T3, Free    TSH    T3, Free    T4, Free        Hypothyroidism-levels are not stable  TSH levels are still high  Change the dosage to 150 mcg for 5 days a week and 175 mcg for 2 days a week.  Repeat blood work-up in 4 weeks from now.    With the patient's delivery on April 19, 2021 explained to the patient that it takes at least 4 weeks for the levels to stabilize and it might not result in big changes in terms of the development and the growth of the baby asked by the third trimester the fetus is self-sufficient in terms of maintaining her own thyroid requirements.    Also explained the importance of thyroid replacement during pregnancy especially with the growth and development of the baby.    We will repeat the blood work-up in 6 weeks after the delivery of the baby to reassess the new requirements that the patient might be needing after the delivery of the baby.        Interpreted the blood work-up/imaging results performed by the primary care/consulting physician -    Refills sent to pharmacy    Follow Up     Patient was given instructions and counseling regarding her condition or for health maintenance advice. Please see specific information pulled into the AVS if appropriate.       Thank you for asking me to see your patient, Sarita MACKEY  "in consultation.         Bereket Garcia MD  03/16/21      EMR Dragon / transcription disclaimer:     \"Dictated utilizing Dragon dictation\".              "

## 2021-03-18 ENCOUNTER — ROUTINE PRENATAL (OUTPATIENT)
Dept: OBSTETRICS AND GYNECOLOGY | Facility: CLINIC | Age: 36
End: 2021-03-18

## 2021-03-18 VITALS — DIASTOLIC BLOOD PRESSURE: 72 MMHG | SYSTOLIC BLOOD PRESSURE: 118 MMHG | BODY MASS INDEX: 46.89 KG/M2 | WEIGHT: 293 LBS

## 2021-03-18 DIAGNOSIS — O28.3 ABNORMAL FETAL ULTRASOUND: ICD-10-CM

## 2021-03-18 DIAGNOSIS — E03.9 ACQUIRED HYPOTHYROIDISM: ICD-10-CM

## 2021-03-18 DIAGNOSIS — O09.299 HX OF PREECLAMPSIA, PRIOR PREGNANCY, CURRENTLY PREGNANT: ICD-10-CM

## 2021-03-18 DIAGNOSIS — O09.523 MULTIGRAVIDA OF ADVANCED MATERNAL AGE IN THIRD TRIMESTER: Primary | ICD-10-CM

## 2021-03-18 DIAGNOSIS — Z98.891 PREVIOUS CESAREAN SECTION: ICD-10-CM

## 2021-03-18 LAB
GLUCOSE UR STRIP-MCNC: NEGATIVE MG/DL
PROT UR STRIP-MCNC: NEGATIVE MG/DL

## 2021-03-18 PROCEDURE — 0502F SUBSEQUENT PRENATAL CARE: CPT | Performed by: OBSTETRICS & GYNECOLOGY

## 2021-03-18 NOTE — PROGRESS NOTES
OB follow up     Sarita MACKEY is a 35 y.o.  34w4d being seen today for her obstetrical visit.  Patient reports no bleeding, no contractions and no leaking. Fetal movement: normal.  Prenatal care is complicated by advanced maternal age, previous  x4 and a history of fetal left multicystic kidney disease.  She is seen MFM as well as endocrinology.  This week she had her Synthroid dose adjusted.  TSH is still too high.  Retest in 4 weeks.  She presents today for prenatal care and biophysical profile.    Review of Systems  No bleeding, No cramping/contractions     /72   Wt (!) 148 kg (326 lb 12.8 oz)   LMP 2020 (Exact Date)   BMI 46.89 kg/m²     FHT: present BPM   Uterine Size:     BPP was 8 out of 10 (-2 for breathing motion).  NST was reactive.  TERESA was normal at 15 cm.  The infant was vertex.  Left multicystic kidney persisted and a right kidney was normal.  Placenta was grade 3.    Assessment/Plan:    1) 35 y.o.  -pregnancy at 34w4d    2)   Encounter Diagnoses   Name Primary?   • Multigravida of advanced maternal age in third trimester Yes   • Abnormal fetal ultrasound: dilated L fetal renal pelvis:  MFM consult: see report    • Hx of preeclampsia, prior pregnancies, currently pregnant    • Previous  section x 4- plan RLTCS at 39 weeks, or earlier    • Acquired hypothyroidism    Continue new dose of levothyroxine.  BPP reassuring.  Repeat in 1 week.    3) Reviewed this stage of pregnancy  4) Problem list updated     Return in about 1 week (around 3/25/2021) for BPP/NST, OB Ailyn.      Ashu Smith MD    3/18/2021  15:36 EDT

## 2021-03-25 ENCOUNTER — ROUTINE PRENATAL (OUTPATIENT)
Dept: OBSTETRICS AND GYNECOLOGY | Facility: CLINIC | Age: 36
End: 2021-03-25

## 2021-03-25 VITALS — SYSTOLIC BLOOD PRESSURE: 132 MMHG | DIASTOLIC BLOOD PRESSURE: 78 MMHG | BODY MASS INDEX: 46.8 KG/M2 | WEIGHT: 293 LBS

## 2021-03-25 DIAGNOSIS — Z87.51 HISTORY OF PRETERM DELIVERY: ICD-10-CM

## 2021-03-25 DIAGNOSIS — E66.01 MORBIDLY OBESE (HCC): ICD-10-CM

## 2021-03-25 DIAGNOSIS — O09.523 MULTIGRAVIDA OF ADVANCED MATERNAL AGE IN THIRD TRIMESTER: ICD-10-CM

## 2021-03-25 DIAGNOSIS — E03.9 ACQUIRED HYPOTHYROIDISM: ICD-10-CM

## 2021-03-25 DIAGNOSIS — Z98.891 PREVIOUS CESAREAN SECTION: ICD-10-CM

## 2021-03-25 DIAGNOSIS — O09.299 HX OF PREECLAMPSIA, PRIOR PREGNANCY, CURRENTLY PREGNANT: Primary | ICD-10-CM

## 2021-03-25 PROCEDURE — 0502F SUBSEQUENT PRENATAL CARE: CPT | Performed by: OBSTETRICS & GYNECOLOGY

## 2021-03-25 NOTE — PROGRESS NOTES
OB follow up     Sarita MACKEY is a 35 y.o.  35w4d being seen today for her obstetrical visit.  Patient reports no bleeding, no contractions and no leaking. Fetal movement: normal.  Prenatal care complicated by 4 prior  sections, morbid obesity, history of preeclampsia, history of  delivery.  She also has hypothyroidism.  She takes Synthroid daily.    Review of Systems  No bleeding, No cramping/contractions     /78   Wt (!) 148 kg (326 lb 3.2 oz)   LMP 2020 (Exact Date)   BMI 46.80 kg/m²     FHT: present BPM   Uterine Size:     BPP is 10 out of 10, TERESA is 17 cm.  Vertex position.  Persistent fetal left kidney showing multicystic disease.    Assessment/Plan:    1) 35 y.o.  -pregnancy at 35w4d    2)   Encounter Diagnoses   Name Primary?   • Hx of preeclampsia, prior pregnancies, currently pregnant Yes   • History of  delivery- went into labor at 36 weeks in  3rd pregnancy and then had term pregnancy    • Previous  section x 4- plan RLTCS at 39 weeks, or earlier    • Morbidly obese (CMS/HCC)    • Acquired hypothyroidism    • Multigravida of advanced maternal age in third trimester    Reassuring  testing.  GBS next visit.    3) Reviewed this stage of pregnancy  4) Problem list updated     Return in about 1 week (around 2021) for BPP/NST, OB INT, GBS.      Ashu Smith MD    3/25/2021  15:09 EDT

## 2021-03-26 RX ORDER — ALBUTEROL SULFATE 90 UG/1
AEROSOL, METERED RESPIRATORY (INHALATION)
Qty: 18 G | Refills: 1 | Status: ON HOLD | OUTPATIENT
Start: 2021-03-26 | End: 2021-04-20

## 2021-03-29 ENCOUNTER — ROUTINE PRENATAL (OUTPATIENT)
Dept: OBSTETRICS AND GYNECOLOGY | Facility: CLINIC | Age: 36
End: 2021-03-29

## 2021-03-29 VITALS — WEIGHT: 293 LBS | BODY MASS INDEX: 47.12 KG/M2 | SYSTOLIC BLOOD PRESSURE: 126 MMHG | DIASTOLIC BLOOD PRESSURE: 82 MMHG

## 2021-03-29 DIAGNOSIS — O28.3 ABNORMAL FETAL ULTRASOUND: ICD-10-CM

## 2021-03-29 DIAGNOSIS — J45.40 MODERATE PERSISTENT ASTHMA WITHOUT COMPLICATION: ICD-10-CM

## 2021-03-29 DIAGNOSIS — O09.523 MULTIGRAVIDA OF ADVANCED MATERNAL AGE IN THIRD TRIMESTER: ICD-10-CM

## 2021-03-29 DIAGNOSIS — Z98.891 PREVIOUS CESAREAN SECTION: ICD-10-CM

## 2021-03-29 DIAGNOSIS — E66.01 MORBIDLY OBESE (HCC): ICD-10-CM

## 2021-03-29 DIAGNOSIS — E03.1 CONGENITAL HYPOTHYROIDISM WITHOUT GOITER: Primary | ICD-10-CM

## 2021-03-29 DIAGNOSIS — O09.299 HX OF PREECLAMPSIA, PRIOR PREGNANCY, CURRENTLY PREGNANT: ICD-10-CM

## 2021-03-29 DIAGNOSIS — Z87.51 HISTORY OF PRETERM DELIVERY: ICD-10-CM

## 2021-03-29 DIAGNOSIS — F17.200 SMOKER: ICD-10-CM

## 2021-03-29 DIAGNOSIS — R82.5 POSITIVE URINE DRUG SCREEN: ICD-10-CM

## 2021-03-29 DIAGNOSIS — Z36.85 ENCOUNTER FOR ANTENATAL SCREENING FOR STREPTOCOCCUS B: ICD-10-CM

## 2021-03-29 LAB
GLUCOSE UR STRIP-MCNC: NEGATIVE MG/DL
PROT UR STRIP-MCNC: NEGATIVE MG/DL

## 2021-03-29 PROCEDURE — 0502F SUBSEQUENT PRENATAL CARE: CPT | Performed by: OBSTETRICS & GYNECOLOGY

## 2021-03-29 NOTE — PROGRESS NOTES
CC: Pt here for ob office visit & ANT for BMI, smoking, hypthyroidism, asthma, AMA.    HPI: Sarita MACKEY is a 35 y.o.  36w1d being seen today for her obstetrical visit.   Patient reports backache .   Fetal movement: normal. .        ROS: Pt denies visual changes, headaches, shortness of breath, chest pain, esophageal reflux, gastric pain,   nausea and vomiting, diarrhea, rashes, vaginal bleeding, edema, hip pain, pelvic pressure.     SMOKER? Yes  Sarita MACKEY  reports that she has been smoking cigarettes. She has a 16.00 pack-year smoking history. She has never used smokeless tobacco.. I have educated her on the risk of diseases from using tobacco products such as cancer, COPD and heart disease.     I advised her to quit and she is not willing to quit.    PT COUNSELED TO QUIT SMOKING IN PREGNANCY.  (Cigarette smoking is associated with increased incidence of IUGR, PROM, PTL, PTD, SIDS, asthma, and ear infections.  . Smoking cessation is the single most important thing she can do to improve the pregnancy outcome.)              ALCOHOL? No  ILLICIT DRUGS? No    O:  /82   Wt (!) 149 kg (328 lb 6.4 oz)   LMP 2020 (Exact Date)   BMI 47.12 kg/m² , additional findings in addition to above flow sheet?: ANT    Data Review:  UA, flow sheet,  TESTING: u/s: 10/10    Lab Results (last 24 hours)     Procedure Component Value Units Date/Time    POC Urinalysis Dipstick [[id number]] Resulted: 21 1511    Specimen: Urine Updated: 21 1513     Glucose, UA Negative     Protein, POC Negative        GBS done.       A:    DIAGNOSES:  35 y.o.  36w1d  Patient Active Problem List   Diagnosis   • Hypothyroidism- on Levothyroixine 100 mcg, check TSH each trimester   • Smoker   • Previous  section x 4- plan RLTCS at 39 weeks, or earlier   • History of  delivery- went into labor at 36 weeks in  3rd pregnancy and then had term pregnancy   • Hx of preeclampsia, prior  pregnancies, currently pregnant   • Moderate persistent asthma without complication   • Morbidly obese (CMS/HCC)   • Positive urine drug screen- + THC, repeat 2020 was neg   • Biological false positive RPR test- repeat RPR normal   • Abnormal fetal ultrasound: dilated L fetal renal pelvis:  MFM consult: see report   • Multigravida of advanced maternal age in third trimester     Diagnoses and all orders for this visit:    1. Congenital hypothyroidism without goiter (Primary)    2. Morbidly obese (CMS/HCC)    3. Previous  section x 4- plan RLTCS at 39 weeks, or earlier    4. Abnormal fetal ultrasound: dilated L fetal renal pelvis:  MFM consult: see report    5. Multigravida of advanced maternal age in third trimester    6. Positive urine drug screen- + THC, repeat 2020 was neg    7. Moderate persistent asthma without complication    8. Smoker    9. Hx of preeclampsia, prior pregnancies, currently pregnant    10. History of  delivery- went into labor at 36 weeks in  3rd pregnancy and then had term pregnancy    11. Encounter for  screening for Streptococcus B  -     Strep B Screen - Swab, Vaginal/Rectum      Pregnancy Assessment : High Risk Pregnancy see above    NEW PROBLEMS? backache    P:  Tests ordered for this or next visit:  TESTING FOR as above.  New Meds:No  Return in about 1 week (around 2021) for ob tummy.    Leonel Seth MD

## 2021-03-31 LAB — GP B STREP DNA SPEC QL NAA+PROBE: NEGATIVE

## 2021-04-05 ENCOUNTER — ROUTINE PRENATAL (OUTPATIENT)
Dept: OBSTETRICS AND GYNECOLOGY | Facility: CLINIC | Age: 36
End: 2021-04-05

## 2021-04-05 VITALS — BODY MASS INDEX: 46.66 KG/M2 | DIASTOLIC BLOOD PRESSURE: 82 MMHG | WEIGHT: 293 LBS | SYSTOLIC BLOOD PRESSURE: 124 MMHG

## 2021-04-05 DIAGNOSIS — R82.5 POSITIVE URINE DRUG SCREEN: ICD-10-CM

## 2021-04-05 DIAGNOSIS — Z87.51 HISTORY OF PRETERM DELIVERY: ICD-10-CM

## 2021-04-05 DIAGNOSIS — E03.9 ACQUIRED HYPOTHYROIDISM: Primary | ICD-10-CM

## 2021-04-05 DIAGNOSIS — Z98.891 PREVIOUS CESAREAN SECTION: ICD-10-CM

## 2021-04-05 DIAGNOSIS — O28.3 ABNORMAL FETAL ULTRASOUND: ICD-10-CM

## 2021-04-05 DIAGNOSIS — O09.523 MULTIGRAVIDA OF ADVANCED MATERNAL AGE IN THIRD TRIMESTER: ICD-10-CM

## 2021-04-05 DIAGNOSIS — O09.299 HX OF PREECLAMPSIA, PRIOR PREGNANCY, CURRENTLY PREGNANT: ICD-10-CM

## 2021-04-05 DIAGNOSIS — J43.2 CENTRILOBULAR EMPHYSEMA (HCC): ICD-10-CM

## 2021-04-05 DIAGNOSIS — F17.200 SMOKER: ICD-10-CM

## 2021-04-05 DIAGNOSIS — E66.01 MORBIDLY OBESE (HCC): ICD-10-CM

## 2021-04-05 LAB
GLUCOSE UR STRIP-MCNC: NEGATIVE MG/DL
PROT UR STRIP-MCNC: ABNORMAL MG/DL

## 2021-04-05 PROCEDURE — 0502F SUBSEQUENT PRENATAL CARE: CPT | Performed by: OBSTETRICS & GYNECOLOGY

## 2021-04-05 RX ORDER — LEVOTHYROXINE SODIUM 0.12 MG/1
125 TABLET ORAL DAILY
Qty: 30 TABLET | Refills: 2 | Status: SHIPPED | OUTPATIENT
Start: 2021-04-05 | End: 2021-04-21 | Stop reason: HOSPADM

## 2021-04-05 NOTE — PROGRESS NOTES
CC: Pt here for ob office visit & ANT for AMA,obesity,smoking, emphysema.    HPI: Sarita MACKEY is a 35 y.o.  37w1d being seen today for her obstetrical visit.   Patient reports backache and denies preeclampsia symptoms and denies labor symptoms.  .   Fetal movement: normal. .        ROS: Pt denies visual changes, headaches, shortness of breath, chest pain, esophageal reflux, gastric pain,   nausea and vomiting, diarrhea, rashes, vaginal bleeding, edema, hip pain, pelvic pressure.     SMOKER? Yes  Sarita MACKEY  reports that she has been smoking cigarettes. She has a 16.00 pack-year smoking history. She has never used smokeless tobacco.. I have educated her on the risk of diseases from using tobacco products such as cancer, COPD and heart disease.     I advised her to quit     PT COUNSELED TO QUIT SMOKING IN PREGNANCY.  (Cigarette smoking is associated with increased incidence of IUGR, PROM, PTL, PTD, SIDS, asthma, and ear infections.  . Smoking cessation is the single most important thing she can do to improve the pregnancy outcome.)     ALCOHOL? No  ILLICIT DRUGS? Yes + history. Denies currently.     O:  /82   Wt (!) 148 kg (325 lb 3.2 oz)   LMP 2020 (Exact Date)   BMI 46.66 kg/m² , additional findings in addition to above flow sheet?: ANT    Data Review:  UA, flow sheet,  TESTING: u/s: EFW = 46%, TERESA = 18.8, BPP = 8/8; w/NST = 10/10    Lab Results (last 24 hours)     Procedure Component Value Units Date/Time    POC Urinalysis Dipstick [[id number]]  (Abnormal) Resulted: 21    Specimen: Urine Updated: 21 1331     Glucose, UA Negative     Protein, POC 2+          A:    DIAGNOSES:  35 y.o.  37w1d  Patient Active Problem List   Diagnosis   • Hypothyroidism- on Levothyroixine 100 mcg, check TSH each trimester   • Smoker   • Previous  section x 4- plan RLTCS at 39 weeks, or earlier   • History of  delivery- went into labor at 36 weeks in  3rd  pregnancy and then had term pregnancy   • Hx of preeclampsia, prior pregnancies, currently pregnant   • Moderate persistent asthma without complication   • Morbidly obese (CMS/HCC)   • Positive urine drug screen- + THC, repeat 2020 was neg   • Biological false positive RPR test- repeat RPR normal   • Abnormal fetal ultrasound: dilated L fetal renal pelvis:  MFM consult: see report   • Multigravida of advanced maternal age in third trimester   • Centrilobular emphysema (CMS/HCC)     Diagnoses and all orders for this visit:    1. Centrilobular emphysema (CMS/HCC)      Pregnancy Assessment : High Risk Pregnancy see list.....    NEW PROBLEMS? no    P:  Tests ordered for this or next visit:  TESTING FOR as above  New Meds:No  QUIT SMOKING!!  Return in about 1 week (around 2021) for ob tummy.    Leonel Seth MD

## 2021-04-07 ENCOUNTER — PREP FOR SURGERY (OUTPATIENT)
Dept: OTHER | Facility: HOSPITAL | Age: 36
End: 2021-04-07

## 2021-04-07 DIAGNOSIS — Z98.891 PREVIOUS CESAREAN SECTION: Primary | ICD-10-CM

## 2021-04-07 RX ORDER — CARBOPROST TROMETHAMINE 250 UG/ML
250 INJECTION, SOLUTION INTRAMUSCULAR AS NEEDED
Status: CANCELLED | OUTPATIENT
Start: 2021-04-07

## 2021-04-07 RX ORDER — METHYLERGONOVINE MALEATE 0.2 MG/ML
200 INJECTION INTRAVENOUS ONCE AS NEEDED
Status: CANCELLED | OUTPATIENT
Start: 2021-04-07

## 2021-04-07 RX ORDER — LIDOCAINE HYDROCHLORIDE 10 MG/ML
5 INJECTION, SOLUTION EPIDURAL; INFILTRATION; INTRACAUDAL; PERINEURAL AS NEEDED
Status: CANCELLED | OUTPATIENT
Start: 2021-04-07

## 2021-04-07 RX ORDER — SODIUM CHLORIDE 0.9 % (FLUSH) 0.9 %
1-10 SYRINGE (ML) INJECTION AS NEEDED
Status: CANCELLED | OUTPATIENT
Start: 2021-04-07

## 2021-04-07 RX ORDER — SODIUM CHLORIDE, SODIUM LACTATE, POTASSIUM CHLORIDE, CALCIUM CHLORIDE 600; 310; 30; 20 MG/100ML; MG/100ML; MG/100ML; MG/100ML
125 INJECTION, SOLUTION INTRAVENOUS CONTINUOUS
Status: CANCELLED | OUTPATIENT
Start: 2021-04-07

## 2021-04-07 RX ORDER — MISOPROSTOL 200 UG/1
800 TABLET ORAL AS NEEDED
Status: CANCELLED | OUTPATIENT
Start: 2021-04-07

## 2021-04-07 RX ORDER — BUPIVACAINE HCL/0.9 % NACL/PF 0.1 %
2 PLASTIC BAG, INJECTION (ML) EPIDURAL ONCE
Status: CANCELLED | OUTPATIENT
Start: 2021-04-07 | End: 2021-04-07

## 2021-04-07 RX ORDER — SODIUM CHLORIDE 0.9 % (FLUSH) 0.9 %
3 SYRINGE (ML) INJECTION EVERY 12 HOURS SCHEDULED
Status: CANCELLED | OUTPATIENT
Start: 2021-04-07

## 2021-04-15 ENCOUNTER — ROUTINE PRENATAL (OUTPATIENT)
Dept: OBSTETRICS AND GYNECOLOGY | Facility: CLINIC | Age: 36
End: 2021-04-15

## 2021-04-15 VITALS — BODY MASS INDEX: 46.83 KG/M2 | DIASTOLIC BLOOD PRESSURE: 82 MMHG | WEIGHT: 293 LBS | SYSTOLIC BLOOD PRESSURE: 118 MMHG

## 2021-04-15 DIAGNOSIS — O09.523 MULTIGRAVIDA OF ADVANCED MATERNAL AGE IN THIRD TRIMESTER: ICD-10-CM

## 2021-04-15 DIAGNOSIS — E66.01 MORBIDLY OBESE (HCC): ICD-10-CM

## 2021-04-15 DIAGNOSIS — E03.9 ACQUIRED HYPOTHYROIDISM: ICD-10-CM

## 2021-04-15 DIAGNOSIS — Z98.891 PREVIOUS CESAREAN SECTION: Primary | ICD-10-CM

## 2021-04-15 DIAGNOSIS — O09.299 HX OF PREECLAMPSIA, PRIOR PREGNANCY, CURRENTLY PREGNANT: ICD-10-CM

## 2021-04-15 DIAGNOSIS — O28.3 ABNORMAL FETAL ULTRASOUND: ICD-10-CM

## 2021-04-15 LAB
GLUCOSE UR STRIP-MCNC: NEGATIVE MG/DL
PROT UR STRIP-MCNC: NEGATIVE MG/DL

## 2021-04-15 PROCEDURE — 59426 ANTEPARTUM CARE ONLY: CPT | Performed by: OBSTETRICS & GYNECOLOGY

## 2021-04-15 NOTE — PROGRESS NOTES
OB follow up     Sarita MACKEY is a 35 y.o.  38w4d being seen today for her obstetrical visit.  Patient reports no bleeding, no contractions and no leaking. Fetal movement: normal.  Prenatal care complicated by morbid obesity, hypothyroidism, hypertension, history of previous  x4, history of  birth.  An abnormal fetal ultrasound with a left fetal renal pelvis dilation.  She presents for biophysical profile today and plans repeat low-transverse  section on Monday.    Review of Systems  No bleeding, No cramping/contractions     /82   Wt (!) 148 kg (326 lb 6.4 oz)   LMP 2020 (Exact Date)   BMI 46.83 kg/m²     FHT: present BPM   Uterine Size:     BPP was 8 out of 10 (2 off for no fetal breathing motion), TERESA normal at 15 cm.  Vertex position.    Assessment/Plan:    1) 35 y.o.  -pregnancy at 38w4d    2)   Encounter Diagnoses   Name Primary?   • Previous  section x 4- plan RLTCS at 39 weeks, or earlier Yes   • Hx of preeclampsia, prior pregnancies, currently pregnant    • Abnormal fetal ultrasound: dilated L fetal renal pelvis:  MFM consult: see report    • Multigravida of advanced maternal age in third trimester    • Morbidly obese (CMS/HCC)    • Acquired hypothyroidism    Plan repeat low-transverse  section on Monday.  All other problems stable.    3) Reviewed this stage of pregnancy  4) Problem list updated     Return for RLTCS Monday.      Ashu Smith MD    4/15/2021  15:06 EDT

## 2021-04-19 ENCOUNTER — ANESTHESIA EVENT (OUTPATIENT)
Dept: OBSTETRICS AND GYNECOLOGY | Facility: HOSPITAL | Age: 36
End: 2021-04-19

## 2021-04-19 ENCOUNTER — ANESTHESIA (OUTPATIENT)
Dept: OBSTETRICS AND GYNECOLOGY | Facility: HOSPITAL | Age: 36
End: 2021-04-19

## 2021-04-19 ENCOUNTER — HOSPITAL ENCOUNTER (INPATIENT)
Facility: HOSPITAL | Age: 36
LOS: 2 days | Discharge: HOME | End: 2021-04-21
Attending: OBSTETRICS & GYNECOLOGY | Admitting: OBSTETRICS & GYNECOLOGY

## 2021-04-19 DIAGNOSIS — Z98.891 PREVIOUS CESAREAN SECTION: ICD-10-CM

## 2021-04-19 DIAGNOSIS — Z98.891 S/P CESAREAN SECTION: Primary | ICD-10-CM

## 2021-04-19 LAB
ABO GROUP BLD: NORMAL
AMPHET+METHAMPHET UR QL: NEGATIVE
AMPHETAMINES UR QL: NEGATIVE
BACTERIA UR QL AUTO: ABNORMAL /HPF
BARBITURATES UR QL SCN: NEGATIVE
BENZODIAZ UR QL SCN: NEGATIVE
BILIRUB UR QL STRIP: NEGATIVE
BLD GP AB SCN SERPL QL: NEGATIVE
BUPRENORPHINE SERPL-MCNC: NEGATIVE NG/ML
CANNABINOIDS SERPL QL: NEGATIVE
CLARITY UR: CLEAR
COCAINE UR QL: NEGATIVE
COLOR UR: YELLOW
DEPRECATED RDW RBC AUTO: 42.7 FL (ref 37–54)
ERYTHROCYTE [DISTWIDTH] IN BLOOD BY AUTOMATED COUNT: 13.2 % (ref 12.3–15.4)
GLUCOSE UR STRIP-MCNC: NEGATIVE MG/DL
HCT VFR BLD AUTO: 38.8 % (ref 34–46.6)
HGB BLD-MCNC: 13.2 G/DL (ref 12–15.9)
HGB UR QL STRIP.AUTO: ABNORMAL
HYALINE CASTS UR QL AUTO: ABNORMAL /LPF
KETONES UR QL STRIP: NEGATIVE
LEUKOCYTE ESTERASE UR QL STRIP.AUTO: NEGATIVE
MCH RBC QN AUTO: 31.1 PG (ref 26.6–33)
MCHC RBC AUTO-ENTMCNC: 34 G/DL (ref 31.5–35.7)
MCV RBC AUTO: 91.3 FL (ref 79–97)
METHADONE UR QL SCN: NEGATIVE
NITRITE UR QL STRIP: NEGATIVE
OPIATES UR QL: NEGATIVE
OXYCODONE UR QL SCN: NEGATIVE
PCP UR QL SCN: NEGATIVE
PH UR STRIP.AUTO: 6 [PH] (ref 4.5–8)
PLATELET # BLD AUTO: 263 10*3/MM3 (ref 140–450)
PMV BLD AUTO: 9.6 FL (ref 6–12)
PROPOXYPH UR QL: NEGATIVE
PROT UR QL STRIP: ABNORMAL
RBC # BLD AUTO: 4.25 10*6/MM3 (ref 3.77–5.28)
RBC # UR: ABNORMAL /HPF
REF LAB TEST METHOD: ABNORMAL
RH BLD: POSITIVE
SARS-COV-2 RNA PNL SPEC NAA+PROBE: NOT DETECTED
SP GR UR STRIP: 1.02 (ref 1–1.03)
SQUAMOUS #/AREA URNS HPF: ABNORMAL /HPF
T&S EXPIRATION DATE: NORMAL
TRICYCLICS UR QL SCN: NEGATIVE
UROBILINOGEN UR QL STRIP: ABNORMAL
WBC # BLD AUTO: 13.43 10*3/MM3 (ref 3.4–10.8)
WBC UR QL AUTO: ABNORMAL /HPF

## 2021-04-19 PROCEDURE — 86900 BLOOD TYPING SEROLOGIC ABO: CPT | Performed by: OBSTETRICS & GYNECOLOGY

## 2021-04-19 PROCEDURE — 85027 COMPLETE CBC AUTOMATED: CPT | Performed by: OBSTETRICS & GYNECOLOGY

## 2021-04-19 PROCEDURE — 87635 SARS-COV-2 COVID-19 AMP PRB: CPT | Performed by: OBSTETRICS & GYNECOLOGY

## 2021-04-19 PROCEDURE — 25010000002 MORPHINE PER 10 MG: Performed by: ANESTHESIOLOGY

## 2021-04-19 PROCEDURE — 25010000002 ONDANSETRON PER 1 MG: Performed by: ANESTHESIOLOGY

## 2021-04-19 PROCEDURE — 94799 UNLISTED PULMONARY SVC/PX: CPT

## 2021-04-19 PROCEDURE — 25010000003 CEFAZOLIN SODIUM-DEXTROSE 2-3 GM-%(50ML) RECONSTITUTED SOLUTION: Performed by: ANESTHESIOLOGY

## 2021-04-19 PROCEDURE — 25010000002 KETOROLAC TROMETHAMINE PER 15 MG: Performed by: OBSTETRICS & GYNECOLOGY

## 2021-04-19 PROCEDURE — 25010000002 KETOROLAC TROMETHAMINE PER 15 MG: Performed by: ANESTHESIOLOGY

## 2021-04-19 PROCEDURE — 88307 TISSUE EXAM BY PATHOLOGIST: CPT

## 2021-04-19 PROCEDURE — 86901 BLOOD TYPING SEROLOGIC RH(D): CPT | Performed by: OBSTETRICS & GYNECOLOGY

## 2021-04-19 PROCEDURE — 80306 DRUG TEST PRSMV INSTRMNT: CPT | Performed by: OBSTETRICS & GYNECOLOGY

## 2021-04-19 PROCEDURE — 86850 RBC ANTIBODY SCREEN: CPT | Performed by: OBSTETRICS & GYNECOLOGY

## 2021-04-19 PROCEDURE — S0260 H&P FOR SURGERY: HCPCS | Performed by: OBSTETRICS & GYNECOLOGY

## 2021-04-19 PROCEDURE — 59510 CESAREAN DELIVERY: CPT | Performed by: OBSTETRICS & GYNECOLOGY

## 2021-04-19 PROCEDURE — 81001 URINALYSIS AUTO W/SCOPE: CPT | Performed by: OBSTETRICS & GYNECOLOGY

## 2021-04-19 RX ORDER — ONDANSETRON 2 MG/ML
INJECTION INTRAMUSCULAR; INTRAVENOUS AS NEEDED
Status: DISCONTINUED | OUTPATIENT
Start: 2021-04-19 | End: 2021-04-19 | Stop reason: SURG

## 2021-04-19 RX ORDER — ONDANSETRON 4 MG/1
4 TABLET, FILM COATED ORAL EVERY 6 HOURS PRN
Status: DISCONTINUED | OUTPATIENT
Start: 2021-04-19 | End: 2021-04-21 | Stop reason: HOSPADM

## 2021-04-19 RX ORDER — METHYLERGONOVINE MALEATE 0.2 MG/ML
200 INJECTION INTRAVENOUS ONCE AS NEEDED
Status: DISCONTINUED | OUTPATIENT
Start: 2021-04-19 | End: 2021-04-21 | Stop reason: HOSPADM

## 2021-04-19 RX ORDER — SODIUM CHLORIDE, SODIUM LACTATE, POTASSIUM CHLORIDE, CALCIUM CHLORIDE 600; 310; 30; 20 MG/100ML; MG/100ML; MG/100ML; MG/100ML
125 INJECTION, SOLUTION INTRAVENOUS CONTINUOUS
Status: DISCONTINUED | OUTPATIENT
Start: 2021-04-19 | End: 2021-04-21 | Stop reason: HOSPADM

## 2021-04-19 RX ORDER — SCOLOPAMINE TRANSDERMAL SYSTEM 1 MG/1
PATCH, EXTENDED RELEASE TRANSDERMAL AS NEEDED
Status: DISCONTINUED | OUTPATIENT
Start: 2021-04-19 | End: 2021-04-19 | Stop reason: SURG

## 2021-04-19 RX ORDER — KETOROLAC TROMETHAMINE 30 MG/ML
30 INJECTION, SOLUTION INTRAMUSCULAR; INTRAVENOUS EVERY 6 HOURS
Status: DISPENSED | OUTPATIENT
Start: 2021-04-19 | End: 2021-04-20

## 2021-04-19 RX ORDER — CEFAZOLIN SODIUM 2 G/50ML
2 SOLUTION INTRAVENOUS ONCE
Status: DISCONTINUED | OUTPATIENT
Start: 2021-04-19 | End: 2021-04-19

## 2021-04-19 RX ORDER — OXYTOCIN/0.9 % SODIUM CHLORIDE 30/500 ML
PLASTIC BAG, INJECTION (ML) INTRAVENOUS CONTINUOUS PRN
Status: DISCONTINUED | OUTPATIENT
Start: 2021-04-19 | End: 2021-04-19 | Stop reason: SURG

## 2021-04-19 RX ORDER — OXYTOCIN/0.9 % SODIUM CHLORIDE 30/500 ML
650 PLASTIC BAG, INJECTION (ML) INTRAVENOUS ONCE
Status: DISCONTINUED | OUTPATIENT
Start: 2021-04-19 | End: 2021-04-21 | Stop reason: HOSPADM

## 2021-04-19 RX ORDER — BUPIVACAINE HYDROCHLORIDE 7.5 MG/ML
INJECTION, SOLUTION INTRASPINAL AS NEEDED
Status: DISCONTINUED | OUTPATIENT
Start: 2021-04-19 | End: 2021-04-19 | Stop reason: SURG

## 2021-04-19 RX ORDER — CEFAZOLIN SODIUM 2 G/50ML
SOLUTION INTRAVENOUS AS NEEDED
Status: DISCONTINUED | OUTPATIENT
Start: 2021-04-19 | End: 2021-04-19 | Stop reason: SURG

## 2021-04-19 RX ORDER — KETOROLAC TROMETHAMINE 30 MG/ML
INJECTION, SOLUTION INTRAMUSCULAR; INTRAVENOUS AS NEEDED
Status: DISCONTINUED | OUTPATIENT
Start: 2021-04-19 | End: 2021-04-19 | Stop reason: SURG

## 2021-04-19 RX ORDER — SODIUM CHLORIDE 9 MG/ML
INJECTION, SOLUTION INTRAVENOUS
Status: DISPENSED
Start: 2021-04-19 | End: 2021-04-19

## 2021-04-19 RX ORDER — CARBOPROST TROMETHAMINE 250 UG/ML
250 INJECTION, SOLUTION INTRAMUSCULAR AS NEEDED
Status: DISCONTINUED | OUTPATIENT
Start: 2021-04-19 | End: 2021-04-21 | Stop reason: HOSPADM

## 2021-04-19 RX ORDER — SODIUM CHLORIDE, SODIUM LACTATE, POTASSIUM CHLORIDE, CALCIUM CHLORIDE 600; 310; 30; 20 MG/100ML; MG/100ML; MG/100ML; MG/100ML
125 INJECTION, SOLUTION INTRAVENOUS CONTINUOUS
Status: DISCONTINUED | OUTPATIENT
Start: 2021-04-19 | End: 2021-04-19

## 2021-04-19 RX ORDER — PROMETHAZINE HYDROCHLORIDE 25 MG/1
12.5 SUPPOSITORY RECTAL EVERY 6 HOURS PRN
Status: DISCONTINUED | OUTPATIENT
Start: 2021-04-19 | End: 2021-04-21 | Stop reason: HOSPADM

## 2021-04-19 RX ORDER — LIDOCAINE HYDROCHLORIDE 10 MG/ML
5 INJECTION, SOLUTION EPIDURAL; INFILTRATION; INTRACAUDAL; PERINEURAL AS NEEDED
Status: DISCONTINUED | OUTPATIENT
Start: 2021-04-19 | End: 2021-04-19

## 2021-04-19 RX ORDER — OXYTOCIN/0.9 % SODIUM CHLORIDE 30/500 ML
PLASTIC BAG, INJECTION (ML) INTRAVENOUS
Status: COMPLETED
Start: 2021-04-19 | End: 2021-04-19

## 2021-04-19 RX ORDER — SIMETHICONE 80 MG
80 TABLET,CHEWABLE ORAL 4 TIMES DAILY PRN
Status: DISCONTINUED | OUTPATIENT
Start: 2021-04-19 | End: 2021-04-21 | Stop reason: HOSPADM

## 2021-04-19 RX ORDER — FAMOTIDINE 10 MG/ML
INJECTION, SOLUTION INTRAVENOUS AS NEEDED
Status: DISCONTINUED | OUTPATIENT
Start: 2021-04-19 | End: 2021-04-19 | Stop reason: SURG

## 2021-04-19 RX ORDER — PROMETHAZINE HYDROCHLORIDE 25 MG/1
25 TABLET ORAL EVERY 6 HOURS PRN
Status: DISCONTINUED | OUTPATIENT
Start: 2021-04-19 | End: 2021-04-21 | Stop reason: HOSPADM

## 2021-04-19 RX ORDER — CEFAZOLIN SODIUM IN 0.9 % NACL 3 G/100 ML
3 INTRAVENOUS SOLUTION, PIGGYBACK (ML) INTRAVENOUS ONCE
Status: DISCONTINUED | OUTPATIENT
Start: 2021-04-19 | End: 2021-04-19

## 2021-04-19 RX ORDER — HYDROMORPHONE HCL 110MG/55ML
0.5 PATIENT CONTROLLED ANALGESIA SYRINGE INTRAVENOUS
Status: DISCONTINUED | OUTPATIENT
Start: 2021-04-19 | End: 2021-04-21 | Stop reason: HOSPADM

## 2021-04-19 RX ORDER — OXYTOCIN/0.9 % SODIUM CHLORIDE 30/500 ML
85 PLASTIC BAG, INJECTION (ML) INTRAVENOUS ONCE
Status: COMPLETED | OUTPATIENT
Start: 2021-04-19 | End: 2021-04-19

## 2021-04-19 RX ORDER — ERYTHROMYCIN 5 MG/G
OINTMENT OPHTHALMIC
Status: DISPENSED
Start: 2021-04-19 | End: 2021-04-19

## 2021-04-19 RX ORDER — SODIUM CHLORIDE 0.9 % (FLUSH) 0.9 %
1-10 SYRINGE (ML) INJECTION AS NEEDED
Status: DISCONTINUED | OUTPATIENT
Start: 2021-04-19 | End: 2021-04-19

## 2021-04-19 RX ORDER — ONDANSETRON 2 MG/ML
4 INJECTION INTRAMUSCULAR; INTRAVENOUS EVERY 6 HOURS PRN
Status: DISCONTINUED | OUTPATIENT
Start: 2021-04-19 | End: 2021-04-21 | Stop reason: HOSPADM

## 2021-04-19 RX ORDER — CEFAZOLIN SODIUM 1 G/3ML
INJECTION, POWDER, FOR SOLUTION INTRAMUSCULAR; INTRAVENOUS
Status: DISPENSED
Start: 2021-04-19 | End: 2021-04-19

## 2021-04-19 RX ORDER — LEVOTHYROXINE SODIUM 0.07 MG/1
150 TABLET ORAL
Status: DISCONTINUED | OUTPATIENT
Start: 2021-04-20 | End: 2021-04-21 | Stop reason: HOSPADM

## 2021-04-19 RX ORDER — MORPHINE SULFATE 1 MG/ML
INJECTION, SOLUTION EPIDURAL; INTRATHECAL; INTRAVENOUS AS NEEDED
Status: DISCONTINUED | OUTPATIENT
Start: 2021-04-19 | End: 2021-04-19 | Stop reason: SURG

## 2021-04-19 RX ORDER — OXYCODONE HYDROCHLORIDE AND ACETAMINOPHEN 5; 325 MG/1; MG/1
2 TABLET ORAL EVERY 4 HOURS PRN
Status: DISCONTINUED | OUTPATIENT
Start: 2021-04-20 | End: 2021-04-21 | Stop reason: HOSPADM

## 2021-04-19 RX ORDER — OXYCODONE HYDROCHLORIDE AND ACETAMINOPHEN 5; 325 MG/1; MG/1
1 TABLET ORAL EVERY 4 HOURS PRN
Status: DISPENSED | OUTPATIENT
Start: 2021-04-19 | End: 2021-04-20

## 2021-04-19 RX ORDER — PHYTONADIONE 1 MG/.5ML
INJECTION, EMULSION INTRAMUSCULAR; INTRAVENOUS; SUBCUTANEOUS
Status: DISPENSED
Start: 2021-04-19 | End: 2021-04-19

## 2021-04-19 RX ORDER — LANOLIN 100 %
OINTMENT (GRAM) TOPICAL
Status: DISCONTINUED | OUTPATIENT
Start: 2021-04-19 | End: 2021-04-21 | Stop reason: HOSPADM

## 2021-04-19 RX ORDER — MISOPROSTOL 200 UG/1
800 TABLET ORAL AS NEEDED
Status: DISCONTINUED | OUTPATIENT
Start: 2021-04-19 | End: 2021-04-21 | Stop reason: HOSPADM

## 2021-04-19 RX ORDER — SODIUM CHLORIDE 0.9 % (FLUSH) 0.9 %
3 SYRINGE (ML) INJECTION EVERY 12 HOURS SCHEDULED
Status: DISCONTINUED | OUTPATIENT
Start: 2021-04-19 | End: 2021-04-19

## 2021-04-19 RX ORDER — OXYCODONE AND ACETAMINOPHEN 7.5; 325 MG/1; MG/1
1 TABLET ORAL EVERY 4 HOURS PRN
Status: DISPENSED | OUTPATIENT
Start: 2021-04-19 | End: 2021-04-20

## 2021-04-19 RX ORDER — PRENATAL VIT/IRON FUM/FOLIC AC 27MG-0.8MG
1 TABLET ORAL DAILY
Refills: 11 | Status: DISCONTINUED | OUTPATIENT
Start: 2021-04-19 | End: 2021-04-21 | Stop reason: HOSPADM

## 2021-04-19 RX ORDER — FAMOTIDINE 10 MG/ML
INJECTION, SOLUTION INTRAVENOUS
Status: COMPLETED
Start: 2021-04-19 | End: 2021-04-19

## 2021-04-19 RX ADMIN — BUPIVACAINE HYDROCHLORIDE IN DEXTROSE 1.6 MG: 7.5 INJECTION, SOLUTION SUBARACHNOID at 12:26

## 2021-04-19 RX ADMIN — SODIUM CHLORIDE, POTASSIUM CHLORIDE, SODIUM LACTATE AND CALCIUM CHLORIDE: 600; 310; 30; 20 INJECTION, SOLUTION INTRAVENOUS at 11:00

## 2021-04-19 RX ADMIN — SCOPALAMINE 1 PATCH: 1 PATCH, EXTENDED RELEASE TRANSDERMAL at 11:00

## 2021-04-19 RX ADMIN — SODIUM CHLORIDE, POTASSIUM CHLORIDE, SODIUM LACTATE AND CALCIUM CHLORIDE 500 ML: 600; 310; 30; 20 INJECTION, SOLUTION INTRAVENOUS at 16:48

## 2021-04-19 RX ADMIN — CEFAZOLIN SODIUM 3 G: 2 SOLUTION INTRAVENOUS at 12:46

## 2021-04-19 RX ADMIN — KETOROLAC TROMETHAMINE 30 MG: 30 INJECTION, SOLUTION INTRAMUSCULAR; INTRAVENOUS at 13:12

## 2021-04-19 RX ADMIN — KETOROLAC TROMETHAMINE 30 MG: 30 INJECTION, SOLUTION INTRAMUSCULAR; INTRAVENOUS at 19:51

## 2021-04-19 RX ADMIN — OXYTOCIN-SODIUM CHLORIDE 0.9% IV SOLN 30 UNIT/500ML 85 ML/HR: 30-0.9/5 SOLUTION at 16:24

## 2021-04-19 RX ADMIN — OXYCODONE HYDROCHLORIDE AND ACETAMINOPHEN 1 TABLET: 5; 325 TABLET ORAL at 14:45

## 2021-04-19 RX ADMIN — OXYCODONE HYDROCHLORIDE AND ACETAMINOPHEN 1 TABLET: 5; 325 TABLET ORAL at 20:57

## 2021-04-19 RX ADMIN — SODIUM CHLORIDE, POTASSIUM CHLORIDE, SODIUM LACTATE AND CALCIUM CHLORIDE 1000 ML: 600; 310; 30; 20 INJECTION, SOLUTION INTRAVENOUS at 18:56

## 2021-04-19 RX ADMIN — FAMOTIDINE 20 MG: 10 INJECTION, SOLUTION INTRAVENOUS at 10:59

## 2021-04-19 RX ADMIN — ONDANSETRON 4 MG: 2 INJECTION INTRAMUSCULAR; INTRAVENOUS at 11:02

## 2021-04-19 RX ADMIN — SODIUM CHLORIDE, POTASSIUM CHLORIDE, SODIUM LACTATE AND CALCIUM CHLORIDE: 600; 310; 30; 20 INJECTION, SOLUTION INTRAVENOUS at 13:09

## 2021-04-19 RX ADMIN — MORPHINE SULFATE 0.2 MG: 1 INJECTION, SOLUTION EPIDURAL; INTRATHECAL; INTRAVENOUS at 12:26

## 2021-04-19 RX ADMIN — Medication 350 ML/HR: at 12:53

## 2021-04-19 RX ADMIN — SODIUM CHLORIDE, POTASSIUM CHLORIDE, SODIUM LACTATE AND CALCIUM CHLORIDE 1000 ML: 600; 310; 30; 20 INJECTION, SOLUTION INTRAVENOUS at 10:55

## 2021-04-19 NOTE — ANESTHESIA PROCEDURE NOTES
Spinal Block    Pre-sedation assessment completed: 4/19/2021 12:19 PM    Patient reassessed immediately prior to procedure    Patient location during procedure: OR  Start Time: 4/19/2021 12:21 PM  Stop Time: 4/19/2021 12:26 PM  Performed By  Anesthesiologist: Yana Serna MD  Spinal Block Prep:  Patient Position:sitting  Sterile Tech:cap, gloves, mask and sterile barriers  Prep:Chloraprep  Patient Monitoring:blood pressure monitoring, continuous pulse oximetry and EKG (FHTs)  Spinal Block Procedure  Approach:midline  Guidance:landmark technique  Location:L4-L5  Needle Type:Sprotte  Needle Gauge:24 G  Placement of Spinal needle event:cerebrospinal fluid aspirated  Paresthesia: left and transient  Fluid Appearance:clear    Med Administered at 4/19/2021 12:26 PM   Post Assessment  Patient Tolerance:patient tolerated the procedure well with no apparent complications  Complications no

## 2021-04-19 NOTE — PLAN OF CARE
Problem: Adult Inpatient Plan of Care  Goal: Plan of Care Review  Outcome: Ongoing, Progressing  Flowsheets (Taken 2021 173)  Progress: improving  Plan of Care Reviewed With:   patient   spouse  Outcome Summary: VSS, repeat  section w/out complications, pain managed well - pain goal met with medications, bonding well with infant, no s/sx of post-op complications   Goal Outcome Evaluation:  Plan of Care Reviewed With: patient, spouse  Progress: improving  Outcome Summary: VSS, repeat  section w/out complications, pain managed well - pain goal met with medications, bonding well with infant, no s/sx of post-op complications  Problem: Adult Inpatient Plan of Care  Goal: Plan of Care Review  Outcome: Ongoing, Progressing  Flowsheets (Taken 2021 173)  Progress: improving  Plan of Care Reviewed With:   patient   spouse  Outcome Summary: VSS, repeat  section w/out complications, pain managed well - pain goal met with medications, bonding well with infant, no s/sx of post-op complications  Goal: Patient-Specific Goal (Individualized)  Outcome: Ongoing, Progressing  Goal: Absence of Hospital-Acquired Illness or Injury  Outcome: Ongoing, Progressing  Intervention: Identify and Manage Fall Risk  Recent Flowsheet Documentation  Taken 2021 1540 by Amarilys Cooper, RN  Safety Promotion/Fall Prevention: safety round/check completed  Taken 2021 1532 by Amarilys Cooper RN  Safety Promotion/Fall Prevention: safety round/check completed  Taken 2021 1510 by Amarilys Cooper, RN  Safety Promotion/Fall Prevention: safety round/check completed  Taken 2021 1440 by Amarilys Cooper, RN  Safety Promotion/Fall Prevention: safety round/check completed  Taken 2021 1410 by Amarilys Cooper, RN  Safety Promotion/Fall Prevention: safety round/check completed  Taken 2021 1125 by Amarilys Cooper, RN  Safety Promotion/Fall Prevention: safety round/check completed  Intervention:  Prevent and Manage VTE (venous thromboembolism) Risk  Recent Flowsheet Documentation  Taken 2021 1540 by Amarilys Cooper, RN  VTE Prevention/Management:   bilateral   sequential compression devices on  Taken 2021 1340 by Amarilys Cooper RN  VTE Prevention/Management:   bilateral   sequential compression devices on  Goal: Optimal Comfort and Wellbeing  Outcome: Ongoing, Progressing  Intervention: Provide Person-Centered Care  Recent Flowsheet Documentation  Taken 2021 1340 by Amarilys Cooper RN  Trust Relationship/Rapport:   care explained   questions answered  Taken 2021 1125 by Amarilys Cooper RN  Trust Relationship/Rapport:   care explained   questions answered  Goal: Readiness for Transition of Care  Outcome: Ongoing, Progressing     Problem: Bleeding ( Delivery)  Goal: Bleeding is Controlled  Outcome: Ongoing, Progressing     Problem: Change in Fetal Wellbeing ( Delivery)  Goal: Stable Fetal Wellbeing  Outcome: Ongoing, Progressing     Problem: Infection ( Delivery)  Goal: Absence of Infection Signs and Symptoms  Outcome: Ongoing, Progressing     Problem: Respiratory Compromise ( Delivery)  Goal: Effective Oxygenation and Ventilation  Outcome: Ongoing, Progressing

## 2021-04-19 NOTE — PROGRESS NOTES
Nutrition Services    Patient Name:  Sarita MACKEY  YOB: 1985  MRN: [id number]  Admit Date:  4/19/2021    Visited pt to confirm item listed as GI intolerance to eggs.  Pt reports that was told by her doctor some time ago that she had an allergy to eggs.  Explained to pt that if this is an allergy we would not be able to serve her any foods with eggs.  Pt verbalized understanding.      Electronically signed by:  Georgette Merchant RD  04/19/21 15:24 EDT

## 2021-04-19 NOTE — OP NOTE
[facility]   Section Operative Note    Pre-Operative Dx:   1) 35 y.o. G 7 P 3124  2) IUP at 39-  3) Indications for  section: 4 prior C-sections  4) morbid obesity  5) advanced maternal age  6) history of  delivery  7) left fetal renal pelvis dilation         Postoperative dx:    1.  Same     Procedure: , Low Transverse    Surgeon: Ashu Mcpherson     Assistant: Uma, assisted with suturing, retracting and delivering the fetus.   Anesthesia: Spinal    EBL:   mls.  800 mls.         IV Fluids: 1500 mls.   UOP: 250 mls.    I/O this shift:  In: 1000 [I.V.:1000]  Out: 225 [Urine:225]   Antibiotics: cefazolin (Ancef)     Infant:      Time of Delivery     Gender: female  infant    Weight: No birth weight on file.     Apgars: 8  @ 1 minute /     9  @ 5 minutes      Meconium:   Nuchal Cord:    no  no            Indication for C/Section:                        Prior C/S              Procedure Details:   Once all questions were answered, the patient was given IV antibiotics for ID prophylaxis. Pt was taken to the OR where spinal anesthesia was administered. A snyder catheter was placed and hung to gravity for the duration of the procedure. SCD's were placed on the lower extremities bilaterally for DVT prophylaxis. Once the pt was prepped and draped and anesthesia was found to be adequate, a Pfannensteil skin incision was made on the abdomen and carried down to the fascia. The fascia was incised and extended with Strong scissors. Kocher clamps were placed on the superior and inferior aspect of the fascia and the rectus abdominal muscles were sharply and bluntly taken down. The rectus abdominal muscles were  in the midline and the periteoneum was entered and bluntly extended. A bladder blade was then inserted and a bladder flap was created. A low transverse incision was made on the uterus and bluntly extended. Artificial rupture of membranes was performed with clear fluid  noted. The fetal head was delivered followed by the rest of the body. Cord was clamped and cut and baby taken to the warmer. Cord blood was collected and the placenta was delivered. The uterus was exteriorized and cleared of all clots and debris. The uterine incision was repaired in 2 layers. The first layer was a running and locked fashion with 0-Vicryl and the second layer was an imbricating repair with 0-Vicryl suture. The uterus was firm and hemostatic. The abdomen was irrigated and aspirated with warm normal saline. The uterus was placed back into the abdomen. The fascia was reapproximated with 0- pds suture. The subcutaneous layer was irrigated and cauterized as needed for hemostasis. The skin was reapproximated with dissolvable subcuticular staples. Pt tolerated the procedure well. Mom and baby are stable and progressing well.      Complications:   None      Disposition:   Mother to Mother Baby/Postpartum  in stable condition currently.   Baby to NBN  in stable condition currently.       Ashu Smith MD  4/19/2021  13:30 EDT

## 2021-04-19 NOTE — ANESTHESIA PREPROCEDURE EVALUATION
Anesthesia Evaluation     Patient summary reviewed and Nursing notes reviewed   history of anesthetic complications (BP drops with SAB):  NPO Solid Status: > 8 hours  NPO Liquid Status: > 8 hours           Airway   Mallampati: I  TM distance: >3 FB  Neck ROM: full  No difficulty expected  Dental - normal exam     Pulmonary - normal exam    breath sounds clear to auscultation  (+) a smoker (one half pack to one pack per day) Current Smoked day of surgery, COPD (used inhaler this am) moderate, asthma,  Cardiovascular - normal exam    Rhythm: regular  Rate: normal    Hypertension: only with previous pregmnancies.    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    Neuro/Psych  GI/Hepatic/Renal/Endo    (+)   thyroid problem   GERD: occ.    Musculoskeletal     Abdominal   (+) obese,    Substance History - negative use     OB/GYN    (+) Pregnant,         Other   arthritis (hips and legs),                      Anesthesia Plan    ASA 3     ITN and spinal     intravenous induction     Anesthetic plan, all risks, benefits, and alternatives have been provided, discussed and informed consent has been obtained with: patient and spouse/significant other.  Use of blood products discussed with patient and spouse/significant other  Consented to blood products.

## 2021-04-19 NOTE — H&P
Patient Care Team:  Rosa Nuñez APRN as PCP - General (Family Medicine)    Chief complaint Prior C/S x 4 at  39 weeks       HPI: 35-year-old  7 para 3-1-2-4 with 4 prior C-sections presents for repeat low-transverse  section.  Prenatal care is complicated by advanced maternal age, morbid obesity, 4 prior  sections, hypothyroidism, history of  birth.  History of preeclampsia with previous pregnancy and dilated left fetal renal pelvis.  She reports active fetal movement no vaginal bleeding and no loss of fluid.   testing has been reassuring to this point.    ROS:   Constitutional: Negative for appetite change, fever and unexpected weight change.   Respiratory: Negative for cough and shortness of breath.    Cardiovascular: Negative for chest pain and palpitations.   Gastrointestinal: Negative for abdominal pain, constipation, diarrhea, nausea and vomiting.   Endocrine: Negative.    Genitourinary: Negative for dyspareunia, pelvic pain and vaginal discharge.   Skin: Negative.    Neurological: Negative for dizziness and headaches.   Hematological: Negative.    Psychiatric/Behavioral: Negative for dysphoric mood and sleep disturbance. The patient is not nervous/anxious.        PMH:   Past Medical History:   Diagnosis Date   • Asthma    • COPD (chronic obstructive pulmonary disease) (CMS/[facility])    • Histoplasmosis    • Hypertension    • Hypothyroid    • Migraine    • Reactive airway disease    • Substance abuse (CMS/[facility])     thc         PSH:   Past Surgical History:   Procedure Laterality Date   •  SECTION     •  SECTION N/A 2018    Procedure:  SECTION REPEAT;  Surgeon: Ashu Smith MD;  Location: [facility] LABOR DELIVERY;  Service: Obstetrics/Gynecology   • WISDOM TOOTH EXTRACTION         SoHx:   Social History     Socioeconomic History   • Marital status:      Spouse name: Not on file   • Number of children: Not on file   •  Years of education: Not on file   • Highest education level: Not on file   Tobacco Use   • Smoking status: Current Every Day Smoker     Packs/day: 1.00     Years: 16.00     Pack years: 16.00     Types: Cigarettes   • Smokeless tobacco: Never Used   Vaping Use   • Vaping Use: Never used   Substance and Sexual Activity   • Alcohol use: No   • Drug use: Yes     Types: Marijuana   • Sexual activity: Yes     Partners: Male       FHx:   Family History   Problem Relation Age of Onset   • Diabetes Father    • Heart disease Father    • COPD Father    • Diabetes Mother    • Breast cancer Mother    • No Known Problems Son    • No Known Problems Daughter    • Lung cancer Maternal Grandmother    • Lung cancer Maternal Grandfather    • No Known Problems Son        PGyn Hx: Deferred    POBHx: As above    Allergies: Eggs or egg-derived products and Ibuprofen    Medications:   No current facility-administered medications on file prior to encounter.     Current Outpatient Medications on File Prior to Encounter   Medication Sig Dispense Refill   • Prenatal Vit-Fe Fumarate-FA (PRENATAL VITAMIN) 27-0.8 MG tablet Take 1 tablet by mouth Daily. 30 tablet 11   • aspirin 81 MG EC tablet Take 1 tablet by mouth Daily. 100 tablet 0   • ipratropium (ATROVENT) 0.02 % nebulizer solution INHALE ONE VIAL IN NEBULIZER Q 4 H IF NEEDED  3   • ipratropium-albuterol (DUO-NEB) 0.5-2.5 mg/mL nebulizer USE 1 VIAL IN NEBULIZER Q 6 H PRN  2   • [DISCONTINUED] albuterol sulfate  (90 Base) MCG/ACT inhaler INHALE 1 TO 2 PUFFS BY MOUTH EVERY 4 TO 6 HOURS AS NEEDED FOR ASTHMA FOR UP TO 30 DAYS 18 g 1             Vital Signs  Temp:  [97.9 °F (36.6 °C)] 97.9 °F (36.6 °C)  Resp:  [18] 18    Physical Exam:  Constitutional: She is oriented to person, place, and time. She appears well-developed and well-nourished.   HENT:   Head: Normocephalic and atraumatic.   Cardiovascular: Normal rate and regular rhythm.    Pulmonary/Chest: Effort normal. No respiratory  distress.   Abdominal: Soft. Bowel sounds are normal. There is no tenderness. There is no rebound and no guarding.   Musculoskeletal: Normal range of motion.   Neurological: She is alert and oriented to person, place, and time.   Skin: Skin is warm and dry.   Psychiatric: She has a normal mood and affect. Her behavior is normal. Judgment and thought content normal.   Nursing note and vitals reviewed.  Debilities/Disabilities Identified: None  Emotional Behavior: Appropriate    Labs: Preop hemoglobin 13.2, GBS negative      Assessment/Plan: IUP at 39 weeks, prior C/S x 4, Hx of PTD, dilated left fetal renal pelvis, obesity, AMA    Plan RLTCS.     Ashu Smith MD  04/19/21  11:08 EDT

## 2021-04-19 NOTE — ANESTHESIA POSTPROCEDURE EVALUATION
Patient: Sarita MACKEY    Procedure Summary     Date: 21 Room / Location: [facility] LABOR DELIVERY 1 / [facility] LABOR DELIVERY    Anesthesia Start: 1213 Anesthesia Stop: 1338    Procedure:  SECTION REPEAT (N/A Abdomen) Diagnosis:       Previous  section      (Previous  section [Z98.891])    Surgeons: Ashu Smith MD Provider:     Anesthesia Type: ITN, spinal ASA Status: 3          Anesthesia Type: ITN, spinal    Vitals  Vitals Value Taken Time   /61 21 1357   Temp     Pulse 63 21 1357   Resp     SpO2     Vitals shown include unvalidated device data.        Post Anesthesia Care and Evaluation    Patient location during evaluation: bedside  Patient participation: complete - patient participated  Level of consciousness: awake and alert  Pain score: 0  Pain management: satisfactory to patient  Airway patency: patent  Anesthetic complications: No anesthetic complications  PONV Status: none  Cardiovascular status: acceptable  Respiratory status: acceptable  Hydration status: acceptable

## 2021-04-20 LAB
BASOPHILS # BLD AUTO: 0.02 10*3/MM3 (ref 0–0.2)
BASOPHILS NFR BLD AUTO: 0.2 % (ref 0–1.5)
DEPRECATED RDW RBC AUTO: 44.7 FL (ref 37–54)
EOSINOPHIL # BLD AUTO: 0.12 10*3/MM3 (ref 0–0.4)
EOSINOPHIL NFR BLD AUTO: 1 % (ref 0.3–6.2)
ERYTHROCYTE [DISTWIDTH] IN BLOOD BY AUTOMATED COUNT: 13.1 % (ref 12.3–15.4)
HCT VFR BLD AUTO: 33.6 % (ref 34–46.6)
HGB BLD-MCNC: 10.9 G/DL (ref 12–15.9)
IMM GRANULOCYTES # BLD AUTO: 0.03 10*3/MM3 (ref 0–0.05)
IMM GRANULOCYTES NFR BLD AUTO: 0.2 % (ref 0–0.5)
LYMPHOCYTES # BLD AUTO: 2.77 10*3/MM3 (ref 0.7–3.1)
LYMPHOCYTES NFR BLD AUTO: 22.5 % (ref 19.6–45.3)
MCH RBC QN AUTO: 30.4 PG (ref 26.6–33)
MCHC RBC AUTO-ENTMCNC: 32.4 G/DL (ref 31.5–35.7)
MCV RBC AUTO: 93.6 FL (ref 79–97)
MONOCYTES # BLD AUTO: 0.84 10*3/MM3 (ref 0.1–0.9)
MONOCYTES NFR BLD AUTO: 6.8 % (ref 5–12)
NEUTROPHILS NFR BLD AUTO: 69.3 % (ref 42.7–76)
NEUTROPHILS NFR BLD AUTO: 8.54 10*3/MM3 (ref 1.7–7)
PLATELET # BLD AUTO: 249 10*3/MM3 (ref 140–450)
PMV BLD AUTO: 9.8 FL (ref 6–12)
RBC # BLD AUTO: 3.59 10*6/MM3 (ref 3.77–5.28)
WBC # BLD AUTO: 12.32 10*3/MM3 (ref 3.4–10.8)

## 2021-04-20 PROCEDURE — 85025 COMPLETE CBC W/AUTO DIFF WBC: CPT | Performed by: OBSTETRICS & GYNECOLOGY

## 2021-04-20 PROCEDURE — 0503F POSTPARTUM CARE VISIT: CPT | Performed by: NURSE PRACTITIONER

## 2021-04-20 PROCEDURE — 25010000002 KETOROLAC TROMETHAMINE PER 15 MG: Performed by: OBSTETRICS & GYNECOLOGY

## 2021-04-20 RX ORDER — ALBUTEROL SULFATE 90 UG/1
AEROSOL, METERED RESPIRATORY (INHALATION)
Qty: 18 G | Refills: 1 | Status: SHIPPED | OUTPATIENT
Start: 2021-04-20 | End: 2021-05-25

## 2021-04-20 RX ORDER — FERROUS GLUCONATE 324(37.5)
324 TABLET ORAL 2 TIMES DAILY WITH MEALS
Status: DISCONTINUED | OUTPATIENT
Start: 2021-04-20 | End: 2021-04-21 | Stop reason: HOSPADM

## 2021-04-20 RX ADMIN — PRENATAL VIT W/ FE FUMARATE-FA TAB 27-0.8 MG 1 TABLET: 27-0.8 TAB at 08:59

## 2021-04-20 RX ADMIN — OXYCODONE HYDROCHLORIDE AND ACETAMINOPHEN 1 TABLET: 7.5; 325 TABLET ORAL at 02:11

## 2021-04-20 RX ADMIN — FERROUS GLUCONATE TAB 324 MG (37.5 MG ELEMENTAL IRON) 324 MG: 324 (37.5 FE) TAB at 13:24

## 2021-04-20 RX ADMIN — SODIUM CHLORIDE, POTASSIUM CHLORIDE, SODIUM LACTATE AND CALCIUM CHLORIDE 125 ML/HR: 600; 310; 30; 20 INJECTION, SOLUTION INTRAVENOUS at 01:03

## 2021-04-20 RX ADMIN — FERROUS GLUCONATE TAB 324 MG (37.5 MG ELEMENTAL IRON) 324 MG: 324 (37.5 FE) TAB at 17:48

## 2021-04-20 RX ADMIN — KETOROLAC TROMETHAMINE 30 MG: 30 INJECTION, SOLUTION INTRAMUSCULAR; INTRAVENOUS at 01:03

## 2021-04-20 RX ADMIN — LEVOTHYROXINE SODIUM 150 MCG: 75 TABLET ORAL at 07:19

## 2021-04-20 RX ADMIN — KETOROLAC TROMETHAMINE 30 MG: 30 INJECTION, SOLUTION INTRAMUSCULAR; INTRAVENOUS at 07:19

## 2021-04-20 RX ADMIN — OXYCODONE HYDROCHLORIDE AND ACETAMINOPHEN 1 TABLET: 7.5; 325 TABLET ORAL at 08:59

## 2021-04-20 RX ADMIN — OXYCODONE AND ACETAMINOPHEN 2 TABLET: 5; 325 TABLET ORAL at 17:48

## 2021-04-20 RX ADMIN — OXYCODONE AND ACETAMINOPHEN 2 TABLET: 5; 325 TABLET ORAL at 22:03

## 2021-04-20 RX ADMIN — OXYCODONE AND ACETAMINOPHEN 2 TABLET: 5; 325 TABLET ORAL at 13:24

## 2021-04-20 NOTE — CASE MANAGEMENT/SOCIAL WORK
Continued Stay Note  [facility]     Patient Name: Sarita MACKEY  MRN: [id number]  Today's Date: 4/20/2021    Admit Date: 4/19/2021    Discharge Plan     Row Name 04/20/21 1256       Plan    Plan Comments  [facility] that [facility]PS was present.  Met with CPS worker Yudelka Barbosa and requested a copy of any action plan. Will continue to follow        Discharge Codes    No documentation.             Alanna Gusman RN

## 2021-04-20 NOTE — PROGRESS NOTES
[facility]   PROGRESS NOTE    Post-Op Day 1 S/P   Subjective   Subjective  Patient reports:  Pain is well controlled with IV toradol.  She is is ambulating. Tolerating diet. Tolerating po -- normal for liquids and normal for solids.  Intake -- c/o of tolerating po solids and tolerating po liquids.   Voiding - has not voided yet since F/C removal; flatus reported..  Vaginal bleeding is as much as expected.    Objective    Objective     Vitals: Vital Signs Range for the last 24 hours  Temperature: Temp:  [97.6 °F (36.4 °C)-98.1 °F (36.7 °C)] 98 °F (36.7 °C)   Temp Source: Temp src: Oral   BP: BP: (101-140)/(53-75) 113/70   Pulse: Heart Rate:  [59-81] 65   Respirations: Resp:  [16-20] 18   SPO2: SpO2:  [96 %-100 %] 98 %   O2 Amount (l/min):     O2 Devices Device (Oxygen Therapy): room air   Weight: Weight:  [148 kg (326 lb)] 148 kg (326 lb)            Physical Exam    Lungs clear to auscultation bilaterally   Abdomen Soft, bowel sounds hypoactive   Incision  Dressing C/D/i    Extremities edema trace and Homans sign is negative, no sign of DVT     I reviewed the patient's new clinical results.    Assessment/Plan        Previous  section x 4- plan RLTCS at 39 weeks, or earlier    Assessment & Plan    Assessment:    Sarita MACKEY is Day 1  post-partum  , Low Transverse   .      Plan:  1) Postop day #1- S/P RLTCS. Rh +. Hgb 10.9g/dL.     2) Postpartum care- advance.     3) Postpartum anemia- Continue ferrous gluconate.     4) Female infant- Breast .    5) Dispo- Consider home tomorrow       LISBETH Miller  21  08:27 EDT

## 2021-04-20 NOTE — PLAN OF CARE
Problem: Adult Inpatient Plan of Care  Goal: Plan of Care Review  Outcome: Ongoing, Progressing  Flowsheets (Taken 4/20/2021 1623)  Progress: improving  Plan of Care Reviewed With:   patient   spouse  Outcome Summary: vss, bonding well with infant, pain controlled with prn pain medication, patient ambulating in room, has showered today, incision dressing removed by patient  Goal: Patient-Specific Goal (Individualized)  Outcome: Ongoing, Progressing  Goal: Absence of Hospital-Acquired Illness or Injury  Outcome: Ongoing, Progressing  Intervention: Identify and Manage Fall Risk  Recent Flowsheet Documentation  Taken 4/20/2021 1550 by Maryse Greer RN  Safety Promotion/Fall Prevention:   safety round/check completed   nonskid shoes/slippers when out of bed  Taken 4/20/2021 1433 by Maryse Greer RN  Safety Promotion/Fall Prevention: safety round/check completed  Taken 4/20/2021 1324 by Maryse Greer RN  Safety Promotion/Fall Prevention: safety round/check completed  Taken 4/20/2021 1134 by Maryse Greer RN  Safety Promotion/Fall Prevention:   safety round/check completed   nonskid shoes/slippers when out of bed  Taken 4/20/2021 0948 by Maryse Greer RN  Safety Promotion/Fall Prevention: safety round/check completed  Taken 4/20/2021 0900 by Maryse Greer RN  Safety Promotion/Fall Prevention:   safety round/check completed   nonskid shoes/slippers when out of bed  Taken 4/20/2021 0719 by Maryse Greer RN  Safety Promotion/Fall Prevention:   safety round/check completed   nonskid shoes/slippers when out of bed  Goal: Optimal Comfort and Wellbeing  Outcome: Ongoing, Progressing  Intervention: Provide Person-Centered Care  Recent Flowsheet Documentation  Taken 4/20/2021 1550 by Maryse Greer RN  Trust Relationship/Rapport:   care explained   choices provided  Taken 4/20/2021 0719 by Maryse Greer RN  Trust Relationship/Rapport:   care explained   choices provided   reassurance provided   thoughts/feelings  acknowledged  Goal: Readiness for Transition of Care  Outcome: Ongoing, Progressing     Problem: Bleeding ( Delivery)  Goal: Bleeding is Controlled  Outcome: Ongoing, Progressing     Problem: Change in Fetal Wellbeing ( Delivery)  Goal: Stable Fetal Wellbeing  Outcome: Ongoing, Progressing     Problem: Infection ( Delivery)  Goal: Absence of Infection Signs and Symptoms  Outcome: Ongoing, Progressing     Problem: Respiratory Compromise ( Delivery)  Goal: Effective Oxygenation and Ventilation  Outcome: Ongoing, Progressing   Goal Outcome Evaluation:  Plan of Care Reviewed With: patient, spouse  Progress: improving  Outcome Summary: vss, bonding well with infant, pain controlled with prn pain medication, patient ambulating in room, has showered today, incision dressing removed by patient

## 2021-04-20 NOTE — PROGRESS NOTES
Patient: Sarita S NARENDRA  Procedure(s):   SECTION REPEAT  Anesthesia type: ITN, spinal    Patient location: [facility] Surgical Floor  Last vitals:   Vitals:    21 0726   BP: 113/70   Pulse: 65   Resp: 18   Temp: 98 °F (36.7 °C)   SpO2: 98%     Level of consciousness: awake, alert and oriented    Post-anesthesia pain: adequate analgesia  Airway patency: patent  Respiratory: unassisted  Cardiovascular: stable and blood pressure at baseline  Hydration: euvolemic    Anesthetic complications: noPatient: Sarita S NARENDRA  Procedure(s):   SECTION REPEAT  Anesthesia type: ITN, spinal    Patient location: PACU  Last vitals:   Vitals:    21 0726   BP: 113/70   Pulse: 65   Resp: 18   Temp: 98 °F (36.7 °C)   SpO2: 98%     Level of consciousness: awake, alert and oriented    Post-anesthesia pain: adequate analgesia  Airway patency: patent  Respiratory: unassisted  Cardiovascular: stable and blood pressure at baseline  Hydration: euvolemic    Anesthetic complications: no

## 2021-04-21 ENCOUNTER — TELEPHONE (OUTPATIENT)
Dept: OBSTETRICS AND GYNECOLOGY | Facility: CLINIC | Age: 36
End: 2021-04-21

## 2021-04-21 VITALS
BODY MASS INDEX: 41.95 KG/M2 | DIASTOLIC BLOOD PRESSURE: 83 MMHG | HEART RATE: 78 BPM | OXYGEN SATURATION: 99 % | SYSTOLIC BLOOD PRESSURE: 125 MMHG | HEIGHT: 70 IN | TEMPERATURE: 97.8 F | RESPIRATION RATE: 18 BRPM | WEIGHT: 293 LBS

## 2021-04-21 PROCEDURE — 0503F POSTPARTUM CARE VISIT: CPT | Performed by: NURSE PRACTITIONER

## 2021-04-21 RX ORDER — FERROUS GLUCONATE 324(37.5)
324 TABLET ORAL
Qty: 30 TABLET | Refills: 2 | Status: SHIPPED | OUTPATIENT
Start: 2021-04-21

## 2021-04-21 RX ORDER — OXYCODONE HYDROCHLORIDE AND ACETAMINOPHEN 5; 325 MG/1; MG/1
2 TABLET ORAL EVERY 4 HOURS PRN
Qty: 20 TABLET | Refills: 0 | Status: SHIPPED | OUTPATIENT
Start: 2021-04-21 | End: 2021-04-24

## 2021-04-21 RX ORDER — DOCUSATE SODIUM 100 MG/1
100 CAPSULE, LIQUID FILLED ORAL 2 TIMES DAILY
Qty: 60 CAPSULE | Refills: 1 | Status: SHIPPED | OUTPATIENT
Start: 2021-04-21

## 2021-04-21 RX ADMIN — OXYCODONE AND ACETAMINOPHEN 2 TABLET: 5; 325 TABLET ORAL at 10:45

## 2021-04-21 RX ADMIN — FERROUS GLUCONATE TAB 324 MG (37.5 MG ELEMENTAL IRON) 324 MG: 324 (37.5 FE) TAB at 08:32

## 2021-04-21 RX ADMIN — PRENATAL VIT W/ FE FUMARATE-FA TAB 27-0.8 MG 1 TABLET: 27-0.8 TAB at 08:32

## 2021-04-21 RX ADMIN — LEVOTHYROXINE SODIUM 150 MCG: 75 TABLET ORAL at 06:28

## 2021-04-21 RX ADMIN — OXYCODONE AND ACETAMINOPHEN 2 TABLET: 5; 325 TABLET ORAL at 06:28

## 2021-04-21 RX ADMIN — OXYCODONE AND ACETAMINOPHEN 2 TABLET: 5; 325 TABLET ORAL at 02:05

## 2021-04-21 NOTE — NURSING NOTE
Spoke with Josep Thibodeaux from Case Management who states she contacted [place] CPS and is waiting for a return phone call from the . Will have to wait on completing discharge until we receive confirmation that it is approved for infant to go home with mother.
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
## 2021-04-21 NOTE — NURSING NOTE
AVS reviewed with pt. and spouse. All questions answered and no further concerns at this time. Instructed to  prescription from pharmacy and schedule postpartum follow up appt with [facility] within 2 weeks. Pt. left via wheelchair in stable condition to go home with spouse and infant.

## 2021-04-21 NOTE — CASE MANAGEMENT/SOCIAL WORK
Rec'd call from Yudelka Barbosa/[place] SW ([phone]) who states baby may dc home with mother today. Request CM to notify of umbilical cord results when available. CM will follow for lab results & update CPS. Anjelica COKER updated.

## 2021-04-21 NOTE — PLAN OF CARE
Goal Outcome Evaluation:  Plan of Care Reviewed With: patient, spouse  Progress: improving  Outcome Summary: vital signs stable, pain controlled with PO pain meds, up ad little, supplementing with formula as needed, plans for d/c home today

## 2021-04-21 NOTE — DISCHARGE SUMMARY
"Obstetrical Discharge Form    Primary OB Clinician: SONYA      Gestational Age: Gestational Age: <None>    Antepartum complications: hypothyroidism, increased BMI, H/O PTD, previous  x 4.     Date of Delivery:  2021     Delivered By: Ashu Mcpherson     Delivery Type: repeat  section, low transverse incision    Tubal Ligation: n/a    Baby: Liveborn female, Apgars 7/9, weight 6 #, 13 oz,     Anesthesia: spinal    Intrapartum complications: None    Laceration: none    Feeding method: bottle    Discharge Date: 2021; Discharge Time: 10:13 EDT    /83 (BP Location: Right arm, Patient Position: Sitting)   Pulse 78   Temp 97.8 °F (36.6 °C) (Oral)   Resp 18   Ht 177.8 cm (70\")   Wt (!) 148 kg (326 lb)   LMP 2020 (Exact Date)   SpO2 99%   Breastfeeding Unknown   BMI 46.78 kg/m²      Lungs: CTAB  Abd: bowel sounds present, dressing C/D/I. Fundus firm.   Ext: Trace edema. Neg Xin's sign. No cords.   Results from last 7 days   Lab Units 21  0631   HEMOGLOBIN g/dL 10.9*       Impression: 1) Postpartum day #2- S/P RLTCS.     2) Postpartum anemia- Continue ferrous gluconate.     3) Female infant- Breast.     4) Contraception- Desires Mirena, check benefits.     Plan:    Patient given written instruction sheet.  Follow-up appointment with [facility] in 2 weeks.    LISBETH Miller  10:13 EDT  2021  "

## 2021-04-21 NOTE — CASE MANAGEMENT/SOCIAL WORK
Case Management Discharge Note      Final Note: dc home         Selected Continued Care - Discharged on 4/21/2021 Admission date: 4/19/2021 - Discharge disposition: Home or Self Care    Destination    No services have been selected for the patient.              Durable Medical Equipment    No services have been selected for the patient.              Dialysis/Infusion    No services have been selected for the patient.              Home Medical Care    No services have been selected for the patient.              Therapy    No services have been selected for the patient.              Community Resources    No services have been selected for the patient.                       Final Discharge Disposition Code: 01 - home or self-care

## 2021-04-26 LAB
LAB AP CASE REPORT: NORMAL
PATH REPORT.FINAL DX SPEC: NORMAL

## 2021-05-07 ENCOUNTER — POSTPARTUM VISIT (OUTPATIENT)
Dept: OBSTETRICS AND GYNECOLOGY | Facility: CLINIC | Age: 36
End: 2021-05-07

## 2021-05-07 VITALS
SYSTOLIC BLOOD PRESSURE: 118 MMHG | BODY MASS INDEX: 41.95 KG/M2 | DIASTOLIC BLOOD PRESSURE: 78 MMHG | WEIGHT: 293 LBS | HEIGHT: 70 IN

## 2021-05-07 DIAGNOSIS — R63.39 DIFFICULTY IN FEEDING AT BREAST: Primary | ICD-10-CM

## 2021-05-07 PROCEDURE — 0503F POSTPARTUM CARE VISIT: CPT | Performed by: NURSE PRACTITIONER

## 2021-05-25 RX ORDER — ALBUTEROL SULFATE 90 UG/1
AEROSOL, METERED RESPIRATORY (INHALATION)
Qty: 18 G | Refills: 1 | Status: SHIPPED | OUTPATIENT
Start: 2021-05-25 | End: 2021-06-24

## 2021-05-26 RX ORDER — ALBUTEROL SULFATE 90 UG/1
AEROSOL, METERED RESPIRATORY (INHALATION)
Qty: 18 G | Refills: 1 | OUTPATIENT
Start: 2021-05-26

## 2021-06-10 ENCOUNTER — POSTPARTUM VISIT (OUTPATIENT)
Dept: OBSTETRICS AND GYNECOLOGY | Facility: CLINIC | Age: 36
End: 2021-06-10

## 2021-06-10 VITALS
WEIGHT: 293 LBS | BODY MASS INDEX: 41.95 KG/M2 | SYSTOLIC BLOOD PRESSURE: 124 MMHG | DIASTOLIC BLOOD PRESSURE: 86 MMHG | HEIGHT: 70 IN

## 2021-06-10 DIAGNOSIS — Z13.9 SCREENING FOR CONDITION: ICD-10-CM

## 2021-06-10 DIAGNOSIS — Z30.430 ENCOUNTER FOR IUD INSERTION: ICD-10-CM

## 2021-06-10 LAB
B-HCG UR QL: NEGATIVE
INTERNAL NEGATIVE CONTROL: NORMAL
INTERNAL POSITIVE CONTROL: NORMAL
Lab: NORMAL

## 2021-06-10 PROCEDURE — 0503F POSTPARTUM CARE VISIT: CPT | Performed by: OBSTETRICS & GYNECOLOGY

## 2021-06-10 PROCEDURE — 81025 URINE PREGNANCY TEST: CPT | Performed by: OBSTETRICS & GYNECOLOGY

## 2021-06-10 PROCEDURE — 58300 INSERT INTRAUTERINE DEVICE: CPT | Performed by: OBSTETRICS & GYNECOLOGY

## 2021-06-10 NOTE — PROGRESS NOTES
"      Sarita MACKEY is a 35 y.o. patient who presents for follow up of   Chief Complaint   Patient presents with   • Postpartum Care   • Contraception     Mirena insertion       HPI 6 weeks status post repeat low transverse  section.  Patient has no complaints.  She denies any baby blues.  She has both breast and bottlefeeding.  She wants a Mirena IUD placed for birth control.  She is sleeping well.  Bleeding has stopped.    The following portions of the patient's history were reviewed and updated as appropriate: allergies, current medications and problem list.    Review of Systems   Constitutional: Negative for appetite change, fever and unexpected weight change.   HENT: Negative for congestion and sore throat.    Respiratory: Negative for cough and shortness of breath.    Cardiovascular: Negative for chest pain and palpitations.   Gastrointestinal: Negative for abdominal distention, abdominal pain, constipation, diarrhea, nausea and vomiting.   Endocrine: Negative.    Genitourinary: Negative for dyspareunia, menstrual problem, pelvic pain and vaginal discharge.   Skin: Negative.    Neurological: Negative for dizziness and syncope.   Hematological: Negative.    Psychiatric/Behavioral: Negative for dysphoric mood and sleep disturbance. The patient is not nervous/anxious.        /86   Ht 177.8 cm (70\")   Wt (!) 140 kg (307 lb 12.8 oz)   Breastfeeding Yes   BMI 44.16 kg/m²     Physical Exam  Vitals and nursing note reviewed.   Constitutional:       Appearance: She is well-developed.   HENT:      Head: Normocephalic and atraumatic.   Pulmonary:      Effort: Pulmonary effort is normal.   Abdominal:      General: Bowel sounds are normal. There is no distension.      Palpations: Abdomen is soft. There is no mass.      Tenderness: There is no abdominal tenderness. There is no guarding or rebound.      Comments: C/S incision well healed   Genitourinary:     Vagina: Normal.   Musculoskeletal:         " General: Normal range of motion.   Skin:     General: Skin is warm and dry.   Neurological:      Mental Status: She is alert and oriented to person, place, and time.   Psychiatric:         Behavior: Behavior normal.         Thought Content: Thought content normal.         Judgment: Judgment normal.     Informed consent was obtained to insert IUD.  An open sided speculum is placed vagina anterior lip of cervix grasped with single-tooth tenaculum.  The cervix was swabbed with Betadine solution.  Uterus was sounded to 10 cm with a uterine sound.  Mirena IUD was placed.  Patient tolerated procedure well.  Strings were trimmed to 2 cm.      Assessment/Plan    Diagnoses and all orders for this visit:    1. Postpartum care and examination (Primary)    2. Screening for condition  -     POC Pregnancy, Urine    3. Encounter for IUD insertion    IUD warnings discussed with patient.  Follow-up 1 year.    Return in about 1 year (around 6/10/2022) for Annual physical.      Ashu Smith MD  6/10/2021  15:17 EDT

## 2021-06-24 RX ORDER — ALBUTEROL SULFATE 90 UG/1
AEROSOL, METERED RESPIRATORY (INHALATION)
Qty: 18 G | Refills: 1 | Status: SHIPPED | OUTPATIENT
Start: 2021-06-24 | End: 2021-07-19 | Stop reason: SDUPTHER

## 2021-07-22 RX ORDER — ALBUTEROL SULFATE 90 UG/1
2 AEROSOL, METERED RESPIRATORY (INHALATION) EVERY 4 HOURS PRN
Qty: 18 G | Refills: 1 | Status: SHIPPED | OUTPATIENT
Start: 2021-07-22 | End: 2021-08-17

## 2021-07-29 ENCOUNTER — OFFICE VISIT (OUTPATIENT)
Dept: OBSTETRICS AND GYNECOLOGY | Facility: CLINIC | Age: 36
End: 2021-07-29

## 2021-07-29 VITALS
BODY MASS INDEX: 41.95 KG/M2 | HEIGHT: 70 IN | DIASTOLIC BLOOD PRESSURE: 82 MMHG | WEIGHT: 293 LBS | SYSTOLIC BLOOD PRESSURE: 118 MMHG

## 2021-07-29 DIAGNOSIS — T14.8XXA WOUND INFECTION: ICD-10-CM

## 2021-07-29 DIAGNOSIS — N90.89 SKIN TAG OF LABIA: Primary | ICD-10-CM

## 2021-07-29 DIAGNOSIS — L08.9 WOUND INFECTION: ICD-10-CM

## 2021-07-29 PROCEDURE — 11200 RMVL SKIN TAGS UP TO&INC 15: CPT | Performed by: OBSTETRICS & GYNECOLOGY

## 2021-07-29 PROCEDURE — 99213 OFFICE O/P EST LOW 20 MIN: CPT | Performed by: OBSTETRICS & GYNECOLOGY

## 2021-07-29 RX ORDER — CEPHALEXIN 500 MG/1
500 CAPSULE ORAL EVERY 6 HOURS
Qty: 28 CAPSULE | Refills: 0 | Status: SHIPPED | OUTPATIENT
Start: 2021-07-29 | End: 2021-08-05

## 2021-08-02 LAB
DX ICD CODE: NORMAL
PATH REPORT.FINAL DX SPEC: NORMAL
PATH REPORT.GROSS SPEC: NORMAL
PATH REPORT.SITE OF ORIGIN SPEC: NORMAL
PATHOLOGIST NAME: NORMAL
PAYMENT PROCEDURE: NORMAL

## 2021-08-17 RX ORDER — ALBUTEROL SULFATE 90 UG/1
AEROSOL, METERED RESPIRATORY (INHALATION)
Qty: 18 G | Refills: 1 | Status: SHIPPED | OUTPATIENT
Start: 2021-08-17 | End: 2021-09-14

## 2021-09-14 RX ORDER — ALBUTEROL SULFATE 90 UG/1
AEROSOL, METERED RESPIRATORY (INHALATION)
Qty: 18 G | Refills: 1 | Status: SHIPPED | OUTPATIENT
Start: 2021-09-14

## 2024-05-22 ENCOUNTER — OFFICE VISIT (OUTPATIENT)
Dept: OBSTETRICS AND GYNECOLOGY | Facility: CLINIC | Age: 39
End: 2024-05-22
Payer: MEDICARE

## 2024-05-22 VITALS
HEIGHT: 70 IN | SYSTOLIC BLOOD PRESSURE: 126 MMHG | BODY MASS INDEX: 41.95 KG/M2 | DIASTOLIC BLOOD PRESSURE: 80 MMHG | WEIGHT: 293 LBS

## 2024-05-22 DIAGNOSIS — Z30.432 ENCOUNTER FOR IUD REMOVAL: Primary | ICD-10-CM

## 2024-05-22 DIAGNOSIS — A63.0 CONDYLOMA ACUMINATUM IN FEMALE: ICD-10-CM

## 2024-05-22 DIAGNOSIS — Z13.89 SCREENING FOR GENITOURINARY CONDITION: ICD-10-CM

## 2024-05-22 LAB
B-HCG UR QL: NEGATIVE
BILIRUB BLD-MCNC: NEGATIVE MG/DL
CLARITY, POC: CLEAR
COLOR UR: YELLOW
EXPIRATION DATE: NORMAL
GLUCOSE UR STRIP-MCNC: NEGATIVE MG/DL
INTERNAL NEGATIVE CONTROL: NORMAL
INTERNAL POSITIVE CONTROL: NORMAL
KETONES UR QL: NEGATIVE
LEUKOCYTE EST, POC: NEGATIVE
Lab: NORMAL
NITRITE UR-MCNC: NEGATIVE MG/ML
PH UR: 5 [PH] (ref 5–8)
PROT UR STRIP-MCNC: NEGATIVE MG/DL
RBC # UR STRIP: NEGATIVE /UL
SP GR UR: 1 (ref 1–1.03)
UROBILINOGEN UR QL: NORMAL

## 2024-05-22 RX ORDER — ACETAMINOPHEN AND CODEINE PHOSPHATE 120; 12 MG/5ML; MG/5ML
1 SOLUTION ORAL DAILY
Qty: 84 TABLET | Refills: 3 | Status: SHIPPED | OUTPATIENT
Start: 2024-05-22

## 2024-05-22 RX ORDER — IMIQUIMOD 12.5 MG/.25G
1 CREAM TOPICAL 3 TIMES WEEKLY
Qty: 24 EACH | Refills: 1 | Status: SHIPPED | OUTPATIENT
Start: 2024-05-22

## 2024-05-22 NOTE — PROGRESS NOTES
IUD Removal Procedure Note    Type of IUD:  Mirena  Date of insertion:  3 years ago  Reason for removal:  Side effect: pain  Other relevant history/information:  none    Procedure Time Out Documentation      Procedure Details  IUD strings visible:  no  Local anesthesia:  None  Tenaculum used:  Yes, single tooth  Removal:  An alligator forceps was entered through the cervical os after the cervix and vagina were prepped.  The IUD was grasped and removed intact.  5 attempt(s) were needed for successful removal.  The patient tolerated the procedure well.    All appropriate instructions regarding removal were reviewed.    Tolerated well  No apparent complications  Post procedure diagnosis : IUD removal     Plans for contraception:  oral contraceptives Micronor (smoker)    Also complains of return of condyloma.  Had removal done before she left for [place].  I checked back and pathology was condyloma acuminatum.  Has lesions on her light labia and multiple lesions around the rectum.  Wants them removed.  We discussed options of laser, Aldara.  Patient was like to try Aldara first.  Plan treatment of the more localized condyloma for 3 months in hopes that the perianal lesions go away as well.  Would not like to laser that area for patient comfort reasons.    Ashu Smith MD

## 2025-06-09 ENCOUNTER — TELEPHONE (OUTPATIENT)
Dept: OBSTETRICS AND GYNECOLOGY | Facility: CLINIC | Age: 40
End: 2025-06-09
Payer: MEDICARE

## 2025-06-09 NOTE — TELEPHONE ENCOUNTER
Caller: Sarita Nails    Relationship to patient: Self    Best call back number: [phone] / LVM    Chief complaint: WANTS TO BE SEEN SOONER THAN 07/07/25    Type of visit: NEW OB W/ U/S - LMP 03/10/25    If rescheduling, when is the original appointment: 07/07/25    Additional notes: NEW OB - LMP 03/10/25 - PT IS 13 WEEKS ALONG - PER [facility] REFERENCE TOOL - PTS ARE TO BE SCHEDULED BETWEEN 6-8 WEEKS ALONG FOR NEW OB - IF OVER THAT PERIOD TO WT TO OFFICE TO SCHEDULE - [facility]RA KAUFMAN/ JANE AND WAS TOLD TO SCHEDULE FIRST AVAILABLE - APPT SCHEDULED [facility] 07/07/25 @ [facility] -     PT WOULD LIKE TO BE SEEN SOONER AND WOULD LIKE TO BE SEEN AT [facility] IF AT ALL POSSIBLE     PLEASE CALL PT TO LET HER KNOW     THANK YOU!

## 2025-06-12 ENCOUNTER — OFFICE VISIT (OUTPATIENT)
Dept: OBSTETRICS AND GYNECOLOGY | Facility: CLINIC | Age: 40
End: 2025-06-12
Payer: MEDICARE

## 2025-06-12 VITALS
BODY MASS INDEX: 40.8 KG/M2 | DIASTOLIC BLOOD PRESSURE: 84 MMHG | WEIGHT: 285 LBS | HEIGHT: 70 IN | SYSTOLIC BLOOD PRESSURE: 134 MMHG

## 2025-06-12 DIAGNOSIS — Z32.01 POSITIVE URINE PREGNANCY TEST: Primary | ICD-10-CM

## 2025-06-12 DIAGNOSIS — Z87.51 HISTORY OF PRETERM DELIVERY: ICD-10-CM

## 2025-06-12 DIAGNOSIS — Z91.89 AT HIGH RISK FOR BREAST CANCER: ICD-10-CM

## 2025-06-12 DIAGNOSIS — Z13.89 SCREENING FOR GENITOURINARY CONDITION: ICD-10-CM

## 2025-06-12 DIAGNOSIS — Z98.891 PREVIOUS CESAREAN SECTION: ICD-10-CM

## 2025-06-12 DIAGNOSIS — O09.299 HX OF PREECLAMPSIA, PRIOR PREGNANCY, CURRENTLY PREGNANT: ICD-10-CM

## 2025-06-12 LAB
B-HCG UR QL: POSITIVE
BILIRUB BLD-MCNC: NEGATIVE MG/DL
CLARITY, POC: CLEAR
COLOR UR: YELLOW
EXPIRATION DATE: ABNORMAL
GLUCOSE UR STRIP-MCNC: NEGATIVE MG/DL
INTERNAL NEGATIVE CONTROL: ABNORMAL
INTERNAL POSITIVE CONTROL: ABNORMAL
KETONES UR QL: NEGATIVE
LEUKOCYTE EST, POC: NEGATIVE
Lab: ABNORMAL
NITRITE UR-MCNC: NEGATIVE MG/ML
PH UR: 5 [PH] (ref 5–8)
PROT UR STRIP-MCNC: NEGATIVE MG/DL
RBC # UR STRIP: NEGATIVE /UL
SP GR UR: 1 (ref 1–1.03)
UROBILINOGEN UR QL: NORMAL

## 2025-06-12 RX ORDER — PRENATAL VIT/IRON FUM/FOLIC AC 27MG-0.8MG
TABLET ORAL DAILY
COMMUNITY

## 2025-06-12 RX ORDER — ALBUTEROL SULFATE 90 UG/1
2 INHALANT RESPIRATORY (INHALATION) EVERY 4 HOURS PRN
Qty: 18 G | Refills: 1 | Status: SHIPPED | OUTPATIENT
Start: 2025-06-12

## 2025-06-12 RX ORDER — ONDANSETRON 4 MG/1
4 TABLET, FILM COATED ORAL EVERY 8 HOURS PRN
Qty: 30 TABLET | Refills: 1 | Status: SHIPPED | OUTPATIENT
Start: 2025-06-12 | End: 2026-06-12

## 2025-06-17 PROBLEM — Z32.01 POSITIVE URINE PREGNANCY TEST: Status: ACTIVE | Noted: 2025-06-17

## 2025-07-16 ENCOUNTER — TRANSCRIBE ORDERS (OUTPATIENT)
Dept: ULTRASOUND IMAGING | Facility: HOSPITAL | Age: 40
End: 2025-07-16
Payer: MEDICARE

## 2025-07-16 DIAGNOSIS — Z87.51 HISTORY OF PRETERM DELIVERY: Primary | ICD-10-CM

## 2025-07-24 ENCOUNTER — TELEPHONE (OUTPATIENT)
Dept: ULTRASOUND IMAGING | Facility: HOSPITAL | Age: 40
End: 2025-07-24
Payer: MEDICARE

## (undated) DEVICE — ANTIBACTERIAL UNDYED BRAIDED (POLYGLACTIN 910), SYNTHETIC ABSORBABLE SUTURE: Brand: COATED VICRYL

## (undated) DEVICE — GLV SURG SENSICARE W/ALOE PF LF 7.5 STRL

## (undated) DEVICE — PREP SOL POVIDONE/IODINE BT 4OZ

## (undated) DEVICE — HYDROGEL COATED LATEX URINE METER FOLEY TRAY,16 FR/CH (5.3 MM), 5 ML CATHETER PRE-CONNECTED TO 2000 ML DRAINAGE BAG WITH NEEDLE SAMPLING: Brand: DOVER

## (undated) DEVICE — SUCTION CANISTER, 1000CC,SAFELINER: Brand: DEROYAL

## (undated) DEVICE — SOL IRR H2O BTL 1000ML STRL

## (undated) DEVICE — APPL CHLORAPREP W/TINT 26ML ORNG

## (undated) DEVICE — PK C/SECT 40

## (undated) DEVICE — TRY SKINPREP DRYPREP

## (undated) DEVICE — SUT VIC 0 CTX 36IN J978H